# Patient Record
Sex: FEMALE | Race: WHITE
[De-identification: names, ages, dates, MRNs, and addresses within clinical notes are randomized per-mention and may not be internally consistent; named-entity substitution may affect disease eponyms.]

---

## 2017-07-26 ENCOUNTER — HOSPITAL ENCOUNTER (INPATIENT)
Dept: HOSPITAL 47 - EC | Age: 52
LOS: 7 days | Discharge: HOME | DRG: 199 | End: 2017-08-02
Attending: HOSPITALIST | Admitting: HOSPITALIST
Payer: MEDICAID

## 2017-07-26 VITALS — BODY MASS INDEX: 23.3 KG/M2

## 2017-07-26 DIAGNOSIS — J18.0: ICD-10-CM

## 2017-07-26 DIAGNOSIS — H40.9: ICD-10-CM

## 2017-07-26 DIAGNOSIS — S27.329A: ICD-10-CM

## 2017-07-26 DIAGNOSIS — S22.42XA: ICD-10-CM

## 2017-07-26 DIAGNOSIS — T40.605A: ICD-10-CM

## 2017-07-26 DIAGNOSIS — K59.03: ICD-10-CM

## 2017-07-26 DIAGNOSIS — S27.0XXA: Primary | ICD-10-CM

## 2017-07-26 DIAGNOSIS — J44.1: ICD-10-CM

## 2017-07-26 DIAGNOSIS — Z98.82: ICD-10-CM

## 2017-07-26 DIAGNOSIS — Z88.8: ICD-10-CM

## 2017-07-26 DIAGNOSIS — Z79.899: ICD-10-CM
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
## 2017-07-26 DIAGNOSIS — F17.210: ICD-10-CM

## 2017-07-26 DIAGNOSIS — J44.0: ICD-10-CM
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
## 2017-07-26 LAB
ALP SERPL-CCNC: 91 U/L (ref 38–126)
ALT SERPL-CCNC: 39 U/L (ref 9–52)
ANION GAP SERPL CALC-SCNC: 9 MMOL/L
AST SERPL-CCNC: 28 U/L (ref 14–36)
BASOPHILS # BLD AUTO: 0 K/UL (ref 0–0.2)
BASOPHILS NFR BLD AUTO: 0 %
BUN SERPL-SCNC: 12 MG/DL (ref 7–17)
CALCIUM SPEC-MCNC: 9.2 MG/DL (ref 8.4–10.2)
CH: 33.1
CHCM: 33.6
CHLORIDE SERPL-SCNC: 105 MMOL/L (ref 98–107)
CO2 SERPL-SCNC: 27 MMOL/L (ref 22–30)
EOSINOPHIL # BLD AUTO: 0 K/UL (ref 0–0.7)
EOSINOPHIL NFR BLD AUTO: 0 %
ERYTHROCYTE [DISTWIDTH] IN BLOOD BY AUTOMATED COUNT: 4.02 M/UL (ref 3.8–5.4)
ERYTHROCYTE [DISTWIDTH] IN BLOOD: 13.4 % (ref 11.5–15.5)
GLUCOSE SERPL-MCNC: 98 MG/DL (ref 74–99)
HCT VFR BLD AUTO: 39.9 % (ref 34–46)
HDW: 2.22
HGB BLD-MCNC: 13.5 GM/DL (ref 11.4–16)
LUC NFR BLD AUTO: 1 %
LYMPHOCYTES # SPEC AUTO: 1.1 K/UL (ref 1–4.8)
LYMPHOCYTES NFR SPEC AUTO: 12 %
MCH RBC QN AUTO: 33.5 PG (ref 25–35)
MCHC RBC AUTO-ENTMCNC: 33.8 G/DL (ref 31–37)
MCV RBC AUTO: 99.1 FL (ref 80–100)
MONOCYTES # BLD AUTO: 0.5 K/UL (ref 0–1)
MONOCYTES NFR BLD AUTO: 6 %
NEUTROPHILS # BLD AUTO: 6.9 K/UL (ref 1.3–7.7)
NEUTROPHILS NFR BLD AUTO: 80 %
NON-AFRICAN AMERICAN GFR(MDRD): >60
POTASSIUM SERPL-SCNC: 4.6 MMOL/L (ref 3.5–5.1)
PROT SERPL-MCNC: 7.2 G/DL (ref 6.3–8.2)
SODIUM SERPL-SCNC: 141 MMOL/L (ref 137–145)
WBC # BLD AUTO: 0.12 10*3/UL
WBC # BLD AUTO: 8.7 K/UL (ref 3.8–10.6)
WBC (PEROX): 8.17

## 2017-07-26 PROCEDURE — 80053 COMPREHEN METABOLIC PANEL: CPT

## 2017-07-26 PROCEDURE — 0W9B30Z DRAINAGE OF LEFT PLEURAL CAVITY WITH DRAINAGE DEVICE, PERCUTANEOUS APPROACH: ICD-10-PCS

## 2017-07-26 PROCEDURE — 32551 INSERTION OF CHEST TUBE: CPT

## 2017-07-26 PROCEDURE — 96375 TX/PRO/DX INJ NEW DRUG ADDON: CPT

## 2017-07-26 PROCEDURE — 96374 THER/PROPH/DIAG INJ IV PUSH: CPT

## 2017-07-26 PROCEDURE — 71010: CPT

## 2017-07-26 PROCEDURE — 94640 AIRWAY INHALATION TREATMENT: CPT

## 2017-07-26 PROCEDURE — 96376 TX/PRO/DX INJ SAME DRUG ADON: CPT

## 2017-07-26 PROCEDURE — 99285 EMERGENCY DEPT VISIT HI MDM: CPT

## 2017-07-26 PROCEDURE — 85025 COMPLETE CBC W/AUTO DIFF WBC: CPT

## 2017-07-26 PROCEDURE — 36415 COLL VENOUS BLD VENIPUNCTURE: CPT

## 2017-07-26 PROCEDURE — 71260 CT THORAX DX C+: CPT

## 2017-07-26 PROCEDURE — 80048 BASIC METABOLIC PNL TOTAL CA: CPT

## 2017-07-26 PROCEDURE — 94760 N-INVAS EAR/PLS OXIMETRY 1: CPT

## 2017-07-26 RX ADMIN — NICOTINE SCH PATCH: 21 PATCH, EXTENDED RELEASE TRANSDERMAL at 18:10

## 2017-07-26 RX ADMIN — IPRATROPIUM BROMIDE AND ALBUTEROL SULFATE SCH ML: .5; 3 SOLUTION RESPIRATORY (INHALATION) at 20:42

## 2017-07-26 RX ADMIN — HYDROMORPHONE HYDROCHLORIDE PRN MG: 1 INJECTION, SOLUTION INTRAMUSCULAR; INTRAVENOUS; SUBCUTANEOUS at 20:19

## 2017-07-26 RX ADMIN — LATANOPROST SCH DROPS: 50 SOLUTION OPHTHALMIC at 20:19

## 2017-07-26 RX ADMIN — AZITHROMYCIN SCH MG: 250 TABLET, FILM COATED ORAL at 18:14

## 2017-07-26 RX ADMIN — METHYLPREDNISOLONE SODIUM SUCCINATE SCH MG: 40 INJECTION, POWDER, FOR SOLUTION INTRAMUSCULAR; INTRAVENOUS at 22:55

## 2017-07-26 RX ADMIN — HEPARIN SODIUM SCH UNIT: 5000 INJECTION, SOLUTION INTRAVENOUS; SUBCUTANEOUS at 20:18

## 2017-07-26 RX ADMIN — HYDROCODONE BITARTRATE AND ACETAMINOPHEN PRN EACH: 5; 325 TABLET ORAL at 22:56

## 2017-07-26 RX ADMIN — METHYLPREDNISOLONE SODIUM SUCCINATE SCH MG: 40 INJECTION, POWDER, FOR SOLUTION INTRAMUSCULAR; INTRAVENOUS at 18:14

## 2017-07-26 RX ADMIN — CEFAZOLIN SCH MLS/HR: 330 INJECTION, POWDER, FOR SOLUTION INTRAMUSCULAR; INTRAVENOUS at 18:10

## 2017-07-26 NOTE — ED
General Adult HPI





- General


Source: patient, RN notes reviewed


Mode of arrival: ambulatory


Limitations: no limitations





<Kimberley Stauffer - Last Filed: 07/26/17 16:21>





<Endy Hawkins - Last Filed: 07/26/17 16:33>





- General


Chief complaint: Fall


Stated complaint: Fall/Left Flank Pain


Time Seen by Provider: 07/26/17 14:43





- History of Present Illness


Initial comments: 





52-year-old female presents to the emergency Department chief complaint of left-

sided chest pain.  Patient states that this pain started yesterday after she 

fell hitting the left side of her ribs.  Patient states there is a take a deep 

breath it hurts to cough.  Patient states that she hasn't had any fever chills 

there is no head injury.  Patient denies any other complaints at this time.  

Patient was concerned due to her symptoms so she thought that she should be 

evaluated.  Patient denies any recent fever, chills, shortness of breath, chest 

pain, back pain, abdominal pain, nausea vomiting, numbness or tingling, dysuria 

or hematuria, constipation or diarrhea, headaches or visual changes, or any 

other current symptoms. (Kimberley Stauffer)





- Related Data


 Home Medications











 Medication  Instructions  Recorded  Confirmed


 


Latanoprost Ophth [Xalatan 0.005%] 1 drops BOTH EYES HS 07/26/17 07/26/17


 


Timolol 0.5% Ophth Soln [Timoptic 1 drop BOTH EYES DAILY 07/26/17 07/26/17





0.5% Ophth Soln]   











 Allergies











Allergy/AdvReac Type Severity Reaction Status Date / Time


 


metronidazole [From Flagyl] Allergy  Rash/Hives Verified 07/26/17 14:08














Review of Systems


ROS Other: All systems not noted in ROS Statement are negative.





<Kimberley Stauffer - Last Filed: 07/26/17 16:21>


ROS Other: All systems not noted in ROS Statement are negative.





<Endy Hawkins - Last Filed: 07/26/17 16:33>


ROS Statement: 


Those systems with pertinent positive or pertinent negative responses have been 

documented in the HPI.








Past Medical History


Past Medical History: No Reported History


History of Any Multi-Drug Resistant Organisms: None Reported


Past Surgical History: No Surgical Hx Reported


Past Psychological History: No Psychological Hx Reported


Smoking Status: Current every day smoker


Past Alcohol Use History: Occasional


Past Drug Use History: None Reported





<Kimberley Stauffer - Last Filed: 07/26/17 16:21>





General Exam


Limitations: no limitations





<Kimberley Stauffer - Last Filed: 07/26/17 16:21>


General appearance: alert, anxious, in distress


Head exam: Present: atraumatic, normocephalic, normal inspection


Eye exam: Present: normal appearance, PERRL, EOMI.  Absent: scleral icterus, 

conjunctival injection, periorbital swelling


ENT exam: Present: normal exam, mucous membranes moist


Neck exam: Present: normal inspection.  Absent: tenderness, meningismus, 

lymphadenopathy


Respiratory exam: Present: normal lung sounds bilaterally.  Absent: respiratory 

distress, wheezes, rales, rhonchi, stridor


Cardiovascular Exam: Present: normal rhythm, tachycardia, normal heart sounds.  

Absent: systolic murmur, diastolic murmur, rubs, gallop, clicks


GI/Abdominal exam: Present: soft, normal bowel sounds.  Absent: distended, 

tenderness, guarding, rebound, rigid


Extremities exam: Present: normal inspection, full ROM, normal capillary 

refill.  Absent: tenderness, pedal edema, joint swelling, calf tenderness


Back exam: Present: normal inspection


Neurological exam: Present: alert, oriented X3, CN II-XII intact


Psychiatric exam: Present: normal affect, normal mood


Skin exam: Present: warm, dry, intact, normal color.  Absent: rash





<Endy Hawkins - Last Filed: 07/26/17 16:33>





- General Exam Comments


Initial Comments: 





General:  The patient is awake and alert, in no distress, and does not appear 

acutely ill. 


Eye:  Pupils are equal, round and reactive to light, extra-ocular movements are 

intact; there is normal conjunctiva bilaterally.  No signs of icterus.  


Ears, nose, mouth and throat:  There are moist mucous membranes and no oral 

lesions. 


Neck:  The neck is supple, there is no tenderness


Cardiovascular:  There is a regular rate and rhythm. No murmur, rub or gallop 

is appreciated.  Tenderness over the left lateral chest wall


Respiratory:  Lungs are clear to auscultation, respirations are non-labored, 

breath sounds are equal.  No wheezes, stridor, rales, or rhonchi.


Gastrointestinal:  Soft, non-distended, non-tender abdomen without masses or 

organomegaly noted. There is no rebound or guarding present.  No CVA 

tenderness. Bowel sounds are unremarkable.


Back:  There is no tenderness to palpation in the midline. There is no obvious 

deformity. No rashes noted. 


Musculoskeletal:  Normal ROM, no tenderness, There is no pedal edema. There is 

no calf tenderness or swelling. Sensation intact. Pulses equal bilaterally 2+.  


Neurological:  CN II-XII intact, There are no obvious motor or sensory 

deficits. Coordination appears grossly intact. Speech is normal.


Skin:  Skin is warm and dry and no rashes or lesions are noted. 


Psychiatric:  Cooperative, appropriate mood & affect, normal judgment.  


 (Kimberley Stauffer)





Course





<Kimberley Stauffer - Last Filed: 07/26/17 16:21>





<Endy Hawkins - Last Filed: 07/26/17 16:33>





 Vital Signs











  07/26/17 07/26/17





  14:06 16:12


 


Temperature 98.6 F 


 


Pulse Rate 123 H 107 H


 


Respiratory 18 18





Rate  


 


Blood Pressure 127/74 


 


O2 Sat by Pulse 96 





Oximetry  














- Reevaluation(s)


Reevaluation #1: 





07/26/17 16:33


good breathing with thoravent placement (Endy Hawkins)





Procedures





- Chest Tube Insertion


Consent Obtained: verbal consent


Time Out Performed: Yes


Side of Procedure: left


Indication: Pneumothorax


Placed on monitor/pulse oximetry: Yes


Site Prep: Chloroprep


Local Anesthesia: Lidocaine 1%, With Epi


Insertion Site: Midaxillary, Other (2nd)


Tube Size (English): Other (10)


Returns: Air


Sutured in Place: No (bandage)


Dressing Applied: Tape


Attached to Suction: Yes


Type of Suction: Pleuravac


Repeat X-ray Results: Lung Inflated


Patient Tolerated Procedure: well


Complications: Pain





<Endy Hawkins - Last Filed: 07/26/17 16:33>





Medical Decision Making





- Radiology Data


Radiology results: report reviewed, image reviewed





<Kimberley Stauffer - Last Filed: 07/26/17 16:21>





- Lab Data


Result diagrams: 


 07/26/17 16:06








<Endy Hawkins - Last Filed: 07/26/17 16:33>





- Medical Decision Making





52-year-old female presents for left-sided rib pain after fall.  Imaging was 

reviewed that does show a pneumothorax.  This time Dr. Hawkins placed a 

thoravac.  At this time there was successful placement.  This and the patient 

will be admitted to Dr. Ree Bolden will be consult at.  Patient this 

plan. (Kimberley Stauffer)





- Lab Data





 Lab Results











  07/26/17 Range/Units





  16:06 


 


WBC  8.7  (3.8-10.6)  k/uL


 


RBC  4.02  (3.80-5.40)  m/uL


 


Hgb  13.5  (11.4-16.0)  gm/dL


 


Hct  39.9  (34.0-46.0)  %


 


MCV  99.1  (80.0-100.0)  fL


 


MCH  33.5  (25.0-35.0)  pg


 


MCHC  33.8  (31.0-37.0)  g/dL


 


RDW  13.4  (11.5-15.5)  %


 


Plt Count  215  (150-450)  k/uL


 


Neutrophils %  80  %


 


Lymphocytes %  12  %


 


Monocytes %  6  %


 


Eosinophils %  0  %


 


Basophils %  0  %


 


Neutrophils #  6.9  (1.3-7.7)  k/uL


 


Lymphocytes #  1.1  (1.0-4.8)  k/uL


 


Monocytes #  0.5  (0-1.0)  k/uL


 


Eosinophils #  0.0  (0-0.7)  k/uL


 


Basophils #  0.0  (0-0.2)  k/uL














Critical Care Time


Critical Care Time: Yes


Total Critical Care Time: 31





<Endy Hawkins - Last Filed: 07/26/17 16:33>





Disposition


Time of Disposition: 16:22


Decision Date: 07/26/17


Decision Time: 16:22





<Kimberley Stauffer - Last Filed: 07/26/17 16:21>





<Endy Hawkins - Last Filed: 07/26/17 16:33>


Clinical Impression: 


 Fall, Contusion of rib on left side, Acute pneumothorax





Disposition: ADMITTED AS IP TO THIS [facility]


Condition: Stable


Referrals: 


None,Stated [Primary Care Provider] - 1-2 days

## 2017-07-26 NOTE — P.CNPUL
History of Present Illness


Consult date: 07/26/17


Reason for consult: COPD, pneumothorax


History of present illness: 





2-year-old female patient, chronic smoker, known history of COPD, recurrent 

bronchitis, was having symptoms of bronchitis and some mild symptoms of COPD 

exacerbation over the past 2 days.  She subsequently had a fall and her garage 

and she landed on her left chest and she developed significant left-sided chest 

pain increased shortness of breath.  Overnight she decided not to come into the 

hospital.  This morning her breathing was getting worse and she was having also 

increased congestions cough and wheezing.  For that reason, the patient came 

into the emergency department and she was found to have a 15-20% pneumothorax 

in the left apex.  No evidence of any rib fractures on the chest x-ray.  She 

was quite bronchospastic and wheezy and short of breath and tachycardic.  No 

signs of any tension pneumothorax and she was hemodynamically stable.  Pulse ox 

was around 95% on room air.  No hemoptysis.  She was however a having excess 

amount of pain along her left chest at the site of the trauma.  No chest wall 

deformity.  I attended on this patient in the emergency department.  Reviewed 

the chest x-ray.  Discussed with Dr. Hawkins and we inserted a Thoravent with 

successful reexpansion of the left lung.





Review of Systems


All systems: negative


Constitutional: Denies chills, Denies fever


Eyes: denies blurred vision, denies pain


Ears, nose, mouth and throat: Denies headache, Denies sore throat


Cardiovascular: Reports chest pain, Reports decreased exercise tolerance, 

Reports shortness of breath


Respiratory: Reports cough, Reports dyspnea, Reports respiratory infections, 

Reports wheezing


Gastrointestinal: Denies abdominal pain, Denies diarrhea, Denies nausea, Denies 

vomiting


Genitourinary: Denies dysuria, Denies hematuria


Musculoskeletal: Denies myalgias


Integumentary: Denies pruritus, Denies rash


Neurological: Denies numbness, Denies weakness


Psychiatric: Denies anxiety, Denies depression


Endocrine: Denies fatigue, Denies weight change





Past Medical History


Past Medical History: No Reported History, COPD


Additional Past Medical History / Comment(s): COPD, glaucoma


History of Any Multi-Drug Resistant Organisms: None Reported


Past Surgical History: No Surgical Hx Reported, Breast Surgery


Additional Past Surgical History / Comment(s): Cosmetic bilateral breast 

implants


Past Psychological History: No Psychological Hx Reported


Smoking Status: Current every day smoker (Patient smokes one half pack of 

cigarettes a day and she is a 16-easy-jlxa smoker.)


Past Alcohol Use History: Occasional


Past Drug Use History: None Reported





Medications and Allergies


 Home Medications











 Medication  Instructions  Recorded  Confirmed  Type


 


Latanoprost Ophth [Xalatan 0.005%] 1 drops BOTH EYES HS 07/26/17 07/26/17 

History


 


Timolol 0.5% Ophth Soln [Timoptic 1 drop BOTH EYES DAILY 07/26/17 07/26/17 

History





0.5% Ophth Soln]    











 Allergies











Allergy/AdvReac Type Severity Reaction Status Date / Time


 


metronidazole [From Flagyl] Allergy  Rash/Hives Verified 07/26/17 14:08














Physical Exam


Vitals: 


 Vital Signs











  Temp Pulse Resp BP Pulse Ox


 


 07/26/17 14:06  98.6 F  123 H  18  127/74  96








 Intake and Output











 07/26/17 07/26/17 07/26/17





 06:59 14:59 22:59


 


Other:   


 


  Weight  63.503 kg 








 Patient Weight











 07/27/17





 06:59


 


Weight 63.503 kg














Head exam was generally normal. There was no scleral icterus or corneal arcus. 

Mucous membranes were moist.Neck was supple and without jugular venous 

distension, thyromegaly, or carotid bruits. Carotids were easily palpable 

bilaterally. There was no adenopathy.  Lung sounds are diminished in the lung 

apex on the left compared to the right.  There is scattered rhonchi and 

expiratory wheezes throughout the lung fields bilaterally.Cardiac exam revealed 

the PMI to be normally situated and sized. The rhythm was regular and no 

extrasystoles were noted during several minutes of auscultation. The first and 

second heart sounds were normal and physiologic splitting of the second heart 

sound was noted. There were no murmurs, rubs, clicks, or gallops.Abdominal exam 

revealed normal bowel sounds. The abdomen was soft, non-tender, and without 

masses, organomegaly, or appreciable enlargement of the abdominal 

aorta.Examination of the extremities revealed easily palpable radial, femoral 

and pedal pulses. There was no cyanosis, clubbing or edema.





Results





- Diagnostic Findings


Chest x-ray: image reviewed





Assessment and Plan


Plan: 





Assessment





1 traumatic left-sided pneumothorax 15-20% involving the left apex, status post 

successful insertion of a thoravent





2 left-sided chest wall pain, traumatic, rule out underlying pulmonary contusion





3 COPD exacerbation/symptoms of acute bronchitis





4 shortness of breath secondary to above





5 smoker





6 glaucoma





Plan





Start the patient on DuoNeb nebulized treatments around the clock.  We'll put 

the patient on 40 mg of IV Solu Medrol every 6 hours.  We'll put the patient a 

course of Zithromax 500 mg by mouth on a daily basis.  Daily chest x-ray.  

Incentive spirometer.  Dilaudid 0.5-1 mg every 4-6 hours for pain control.  

Subcu heparin for DVT prophylaxis.  Nicotine patch.  Smoking cessation 

counseling was done.  We'll continue to follow.  Blood work will be obtained.

## 2017-07-26 NOTE — XR
EXAMINATION TYPE: XR chest 1V portable

 

DATE OF EXAM: 7/26/2017

 

HISTORY: Pain.

 

REFERENCE: Previous study of earlier today.

 

FINDINGS: Again without is been placed in the right chest. There is near complete resolution of the p
atient's left-sided pneumothorax. A tiny residual pneumothorax persists which is less than 10% by vol
ume.

 

There is a calcified granuloma in the right upper lobe. The lungs are otherwise clear. Pleural spaces
 are clear. Heart size is upper limits of normal.

 

IMPRESSION: 

 

STATUS POST HEIMLICH VALVE REPLACEMENT WITH NEAR COMPLETE RESOLUTION OF THE PATIENT'S LEFT-SIDED PNEU
MOTHORAX.

## 2017-07-26 NOTE — P.GSCN
History of Present Illness


Consult date: 07/26/17


Reason for Consult: 





Left-sided pneumothorax status post fall


History of present illness: 





This a 52-year-old female who is admitted to the hospital.  Patient slipped in 

her garage yesterday.  She states this floor was wet she slipped and struck a 

shelving unit in her garage.  Dissected pain after the fall.  She didn't feel 

well this morning and presented to the emergency room found have a 25% left 

pneumothorax.  There are no evidence of rib fractures.  Patient currently has a 

pneumo vent in place.  Her pneumothorax has resolved.





Review of Systems





- Constitutional


Reports as per HPI





Past Medical History


Past Medical History: No Reported History


Additional Past Medical History / Comment(s): COPD, glaucoma


History of Any Multi-Drug Resistant Organisms: None Reported


Past Surgical History: No Surgical Hx Reported


Additional Past Surgical History / Comment(s): Cosmetic bilateral breast 

implants


Past Psychological History: No Psychological Hx Reported


Smoking Status: Current every day smoker


Past Alcohol Use History: Occasional


Past Drug Use History: None Reported





Medications and Allergies


 Home Medications











 Medication  Instructions  Recorded  Confirmed  Type


 


Latanoprost Ophth [Xalatan 0.005%] 1 drops BOTH EYES HS 07/26/17 07/26/17 

History


 


Timolol 0.5% Ophth Soln [Timoptic 1 drop BOTH EYES DAILY 07/26/17 07/26/17 

History





0.5% Ophth Soln]    











 Allergies











Allergy/AdvReac Type Severity Reaction Status Date / Time


 


metronidazole [From Flagyl] Allergy  Rash/Hives Verified 07/26/17 14:08














Surgical - Exam


 Vital Signs











Temp Pulse Resp BP Pulse Ox


 


 98.6 F   123 H  18   127/74   96 


 


 07/26/17 14:06  07/26/17 14:06  07/26/17 14:06  07/26/17 14:06  07/26/17 14:06














- General


well developed, no distress





- Eyes


PERRL





- ENT


normal pinna





- Neck


no masses





- Respiratory


normal expansion





- Cardiovascular


Rhythm: regular





- Abdomen


Abdomen: soft, non tender





Results





- Labs





 07/26/17 16:06





 07/26/17 16:06


 Diabetes panel











  07/26/17 Range/Units





  16:06 


 


Sodium  141  (137-145)  mmol/L


 


Potassium  4.6  (3.5-5.1)  mmol/L


 


Chloride  105  ()  mmol/L


 


Carbon Dioxide  27  (22-30)  mmol/L


 


BUN  12  (7-17)  mg/dL


 


Creatinine  0.75  (0.52-1.04)  mg/dL


 


Glucose  98  (74-99)  mg/dL


 


Calcium  9.2  (8.4-10.2)  mg/dL


 


AST  28  (14-36)  U/L


 


ALT  39  (9-52)  U/L


 


Alkaline Phosphatase  91  ()  U/L


 


Total Protein  7.2  (6.3-8.2)  g/dL


 


Albumin  4.5  (3.5-5.0)  g/dL








 Calcium panel











  07/26/17 Range/Units





  16:06 


 


Calcium  9.2  (8.4-10.2)  mg/dL


 


Albumin  4.5  (3.5-5.0)  g/dL








 Pituitary panel











  07/26/17 Range/Units





  16:06 


 


Sodium  141  (137-145)  mmol/L


 


Potassium  4.6  (3.5-5.1)  mmol/L


 


Chloride  105  ()  mmol/L


 


Carbon Dioxide  27  (22-30)  mmol/L


 


BUN  12  (7-17)  mg/dL


 


Creatinine  0.75  (0.52-1.04)  mg/dL


 


Glucose  98  (74-99)  mg/dL


 


Calcium  9.2  (8.4-10.2)  mg/dL








 Adrenal panel











  07/26/17 Range/Units





  16:06 


 


Sodium  141  (137-145)  mmol/L


 


Potassium  4.6  (3.5-5.1)  mmol/L


 


Chloride  105  ()  mmol/L


 


Carbon Dioxide  27  (22-30)  mmol/L


 


BUN  12  (7-17)  mg/dL


 


Creatinine  0.75  (0.52-1.04)  mg/dL


 


Glucose  98  (74-99)  mg/dL


 


Calcium  9.2  (8.4-10.2)  mg/dL


 


Total Bilirubin  0.5  (0.2-1.3)  mg/dL


 


AST  28  (14-36)  U/L


 


ALT  39  (9-52)  U/L


 


Alkaline Phosphatase  91  ()  U/L


 


Total Protein  7.2  (6.3-8.2)  g/dL


 


Albumin  4.5  (3.5-5.0)  g/dL














Assessment and Plan


Plan: 





Status post left pneumothorax after fall.  Patient is doing well.  She'll have 

repeat x-rays perform the morning.

## 2017-07-26 NOTE — XR
EXAMINATION TYPE: XR ribs LT w pa chest x-ray , 5 VIEWS

 

DATE OF EXAM ORDERED: 7/26/2017

 

HISTORY: Pain.

 

COMPARISON: None.

 

FINDINGS:  There is blunting of the left CP angle. I could not exclude a left effusion.

 

There is a 20-25% by volume left sided apical pneumothorax. The lungs are clear. Heart size is upper 
limits of normal. No displaced rib fracture is seen.

 

IMPRESSION: 

1. 25% BY VOLUME, LEFT SIDED APICAL PNEUMOTHORAX.

2. SMALL LEFT-SIDED EFFUSION.

3. I DO NOT IDENTIFY A DISPLACED RIB FRACTURE.

## 2017-07-27 RX ADMIN — HYDROMORPHONE HYDROCHLORIDE PRN MG: 1 INJECTION, SOLUTION INTRAMUSCULAR; INTRAVENOUS; SUBCUTANEOUS at 23:32

## 2017-07-27 RX ADMIN — METHYLPREDNISOLONE SODIUM SUCCINATE SCH MG: 40 INJECTION, POWDER, FOR SOLUTION INTRAMUSCULAR; INTRAVENOUS at 23:32

## 2017-07-27 RX ADMIN — HYDROCODONE BITARTRATE AND ACETAMINOPHEN PRN EACH: 5; 325 TABLET ORAL at 05:14

## 2017-07-27 RX ADMIN — CEFAZOLIN SCH MLS/HR: 330 INJECTION, POWDER, FOR SOLUTION INTRAMUSCULAR; INTRAVENOUS at 05:14

## 2017-07-27 RX ADMIN — METHYLPREDNISOLONE SODIUM SUCCINATE SCH MG: 40 INJECTION, POWDER, FOR SOLUTION INTRAMUSCULAR; INTRAVENOUS at 11:12

## 2017-07-27 RX ADMIN — IPRATROPIUM BROMIDE AND ALBUTEROL SULFATE SCH ML: .5; 3 SOLUTION RESPIRATORY (INHALATION) at 11:06

## 2017-07-27 RX ADMIN — HYDROMORPHONE HYDROCHLORIDE PRN MG: 1 INJECTION, SOLUTION INTRAMUSCULAR; INTRAVENOUS; SUBCUTANEOUS at 06:43

## 2017-07-27 RX ADMIN — IPRATROPIUM BROMIDE AND ALBUTEROL SULFATE SCH ML: .5; 3 SOLUTION RESPIRATORY (INHALATION) at 15:20

## 2017-07-27 RX ADMIN — HYDROMORPHONE HYDROCHLORIDE PRN MG: 1 INJECTION, SOLUTION INTRAMUSCULAR; INTRAVENOUS; SUBCUTANEOUS at 20:19

## 2017-07-27 RX ADMIN — HEPARIN SODIUM SCH UNIT: 5000 INJECTION, SOLUTION INTRAVENOUS; SUBCUTANEOUS at 21:35

## 2017-07-27 RX ADMIN — IPRATROPIUM BROMIDE AND ALBUTEROL SULFATE SCH ML: .5; 3 SOLUTION RESPIRATORY (INHALATION) at 19:43

## 2017-07-27 RX ADMIN — METHYLPREDNISOLONE SODIUM SUCCINATE SCH MG: 40 INJECTION, POWDER, FOR SOLUTION INTRAMUSCULAR; INTRAVENOUS at 05:14

## 2017-07-27 RX ADMIN — NICOTINE SCH PATCH: 21 PATCH, EXTENDED RELEASE TRANSDERMAL at 15:51

## 2017-07-27 RX ADMIN — IPRATROPIUM BROMIDE AND ALBUTEROL SULFATE SCH ML: .5; 3 SOLUTION RESPIRATORY (INHALATION) at 07:03

## 2017-07-27 RX ADMIN — HEPARIN SODIUM SCH UNIT: 5000 INJECTION, SOLUTION INTRAVENOUS; SUBCUTANEOUS at 07:33

## 2017-07-27 RX ADMIN — METHYLPREDNISOLONE SODIUM SUCCINATE SCH MG: 40 INJECTION, POWDER, FOR SOLUTION INTRAMUSCULAR; INTRAVENOUS at 15:51

## 2017-07-27 RX ADMIN — HYDROMORPHONE HYDROCHLORIDE PRN MG: 1 INJECTION, SOLUTION INTRAMUSCULAR; INTRAVENOUS; SUBCUTANEOUS at 00:36

## 2017-07-27 RX ADMIN — AZITHROMYCIN SCH MG: 250 TABLET, FILM COATED ORAL at 15:51

## 2017-07-27 RX ADMIN — LATANOPROST SCH DROPS: 50 SOLUTION OPHTHALMIC at 21:35

## 2017-07-27 RX ADMIN — CEFAZOLIN SCH: 330 INJECTION, POWDER, FOR SOLUTION INTRAMUSCULAR; INTRAVENOUS at 16:04

## 2017-07-27 RX ADMIN — HYDROMORPHONE HYDROCHLORIDE PRN MG: 1 INJECTION, SOLUTION INTRAMUSCULAR; INTRAVENOUS; SUBCUTANEOUS at 15:51

## 2017-07-27 RX ADMIN — HYDROMORPHONE HYDROCHLORIDE PRN MG: 1 INJECTION, SOLUTION INTRAMUSCULAR; INTRAVENOUS; SUBCUTANEOUS at 03:41

## 2017-07-27 RX ADMIN — TIMOLOL MALEATE SCH DROPS: 5 SOLUTION OPHTHALMIC at 07:33

## 2017-07-27 RX ADMIN — PANTOPRAZOLE SODIUM SCH MG: 40 TABLET, DELAYED RELEASE ORAL at 06:42

## 2017-07-27 RX ADMIN — HYDROCODONE BITARTRATE AND ACETAMINOPHEN PRN EACH: 5; 325 TABLET ORAL at 21:35

## 2017-07-27 RX ADMIN — HYDROMORPHONE HYDROCHLORIDE PRN MG: 1 INJECTION, SOLUTION INTRAMUSCULAR; INTRAVENOUS; SUBCUTANEOUS at 12:57

## 2017-07-27 RX ADMIN — HYDROMORPHONE HYDROCHLORIDE PRN MG: 1 INJECTION, SOLUTION INTRAMUSCULAR; INTRAVENOUS; SUBCUTANEOUS at 09:41

## 2017-07-27 NOTE — P.PN
Subjective





52-year-old female patient, chronic smoker, known history of COPD, recurrent 

bronchitis, was having symptoms of bronchitis and some mild symptoms of COPD 

exacerbation over the past 2 days.  She subsequently had a fall and her garage 

and she landed on her left chest and she developed significant left-sided chest 

pain increased shortness of breath.  Overnight she decided not to come into the 

hospital.  This morning her breathing was getting worse and she was having also 

increased congestions cough and wheezing.  For that reason, the patient came 

into the emergency department and she was found to have a 15-20% pneumothorax 

in the left apex.  No evidence of any rib fractures on the chest x-ray.  She 

was quite bronchospastic and wheezy and short of breath and tachycardic.  No 

signs of any tension pneumothorax and she was hemodynamically stable.  Pulse ox 

was around 95% on room air.  No hemoptysis.  She was however a having excess 

amount of pain along her left chest at the site of the trauma.  No chest wall 

deformity.  I attended on this patient in the emergency department.  Reviewed 

the chest x-ray.  Discussed with Dr. Hawkins and we inserted a Thoravent with 

successful reexpansion of the left lung.





The patient is seen again today 07/27/2017 in follow-up on the selective care 

unit.  She is awake and alert in no acute distress.  She is breathing easier 

today as compared to yesterday.  Her pain is well controlled.  She is 

maintaining good O2 saturations in the mid 90s on room air.  She's been 

afebrile.  Hemodynamically stable.  Her chest x-ray reveals a small apical 

pneumothorax on the left.  There is no clear evidence of rib fractures however 

on auscultation posteriorly on the left chest there is cracking and friction 

noted.  She does feel that in this area with a deep inhalation as well.  She 

remains on bronchodilators, IV Solu-Medrol and empiric antibiotics in the form 

of azithromycin.





Objective





- Vital Signs


Vital signs: 


 Vital Signs











Temp  97.4 F L  07/27/17 11:18


 


Pulse  84   07/27/17 11:25


 


Resp  16   07/27/17 11:21


 


BP  115/69   07/27/17 11:18


 


Pulse Ox  94 L  07/27/17 11:18








 Intake & Output











 07/26/17 07/27/17 07/27/17





 18:59 06:59 18:59


 


Intake Total  930 125


 


Balance  930 125


 


Weight 63.503 kg  


 


Intake:   


 


  IV  450 


 


    Sodium Chloride 0.9% 1,  450 





    000 ml @ 80 mls/hr IV .   





    [id number] [facility] Rx#:[id number]   


 


  Oral  480 125


 


Other:   


 


  Voiding Method  Toilet 


 


  # Voids  1 














- Exam





Head exam was generally normal. There was no scleral icterus or corneal arcus. 

Mucous membranes were moist.Neck was supple and without jugular venous 

distension, thyromegaly, or carotid bruits. Carotids were easily palpable 

bilaterally. There was no adenopathy.  Lung sounds are diminished in the lung 

apex on the left compared to the right.  There is scattered rhonchi and 

expiratory wheezes throughout the lung fields bilaterally.Cardiac exam revealed 

the PMI to be normally situated and sized. The rhythm was regular and no 

extrasystoles were noted during several minutes of auscultation. The first and 

second heart sounds were normal and physiologic splitting of the second heart 

sound was noted. There were no murmurs, rubs, clicks, or gallops.Abdominal exam 

revealed normal bowel sounds. The abdomen was soft, non-tender, and without 

masses, organomegaly, or appreciable enlargement of the abdominal 

aorta.Examination of the extremities revealed easily palpable radial, femoral 

and pedal pulses. There was no cyanosis, clubbing or edema.





- Labs


CBC & Chem 7: 


 07/26/17 16:06





 07/26/17 16:06





Assessment and Plan


Plan: 





Assessment:





1 traumatic left-sided pneumothorax 15-20% involving the left apex, status post 

successful insertion of a thoravent, small minimal apical thorax remains.





2 left-sided chest wall pain, traumatic, rule out underlying pulmonary contusion

, suspect left posterior fractured rib.





3 COPD exacerbation/symptoms of acute bronchitis





4 shortness of breath secondary to above





5 smoker





6 glaucoma





Plan:





The patient was seen and evaluated by Dr. Bolden.  Her chest x-ray was 

reviewed.  There is still a small apical pneumothorax noted on the left.  Dura-

Vent remains in place.  We'll continue with her pain medications.  We'll keep 

her another 24 hours.  We'll continue treating her COPD exacerbation with 

bronchodilators, IV Solu-Medrol and antibiotics.  We will increase her activity 

as tolerated.  We'll offer a NicoDerm patch.  She'll be educated regarding the 

incentive spirometer and cough and deep breathing exercises as tolerated.  We'

ll continue to follow.

## 2017-07-27 NOTE — XR
EXAMINATION TYPE: XR chest 1V

 

DATE OF EXAM: 7/27/2017

 

COMPARISON: Prior chest x-ray 7/26/2017

 

HISTORY: Pneumothorax

 

TECHNIQUE: Single frontal view of the chest is obtained.

 

FINDINGS:  Left-sided thoracic pain is in place, small apical pneumothorax is present. Distal tip of 
the thoracic vent tubing courses caudally. No evident effusion. Retrocardiac density likely represent
s basilar atelectasis. Correlate to exclude pneumonia. Heart size may be accentuated by rotation. The
re are overlying cardiac leads.

 

IMPRESSION:  Small apical pneumothorax persists. Additional findings above.

## 2017-07-27 NOTE — HP
DATE OF ADMISSION:    07/26/17



CHIEF COMPLAINT:    Left sided chest pain and shortness of breath. 



HISTORY OF PRESENT ILLNESS: This 58-year-old woman with a past medical history 
of multiple medical problems including  COPD, glaucoma being followed by Dr. Bolden in the outpatient setting apparently fell in the garage yesterday on 
the left side and the patient was complaining of pain and feeling of pain 
especially on deep breaths.  The patient also had shortness of breath.  The 
patient came to [facility] and was admitted to the hospital for 
further evaluation and treatment.  

Chest x-ray was done which showed  rib x-ray was done and chest x-ray was done.
  Rib x-ray showed 24% pneumothorax, left sided  small left sided pleural 
effusion and displaced fracture was not seen. The patient admitted to the 
hospital for further evaluation and treatment.  There is no history of any 
fevers, rigors or chills. No history of any headache, loss of consciousness or 
seizures.  valve was placed by Dr. Bolden. 

Past medical history of COPD.  History of glaucoma. 



Medications prior to admission are:   Home medications are reviewed and include:

1.   Timolol 5% ophthalmic solution.

2.   Xalatan eye drops.   



ALLERGIES: FLAGYL.

FAMILY HISTORY:   No history of heart disease or strokes in the family.

SOCIAL HISTORY:     History of smoking. No history of alcohol intake. 



REVIEW OF SYSTEMS:   HEENT: No diminished vision. No diminished hearing.  
Cardiovascular system: No angina or palpitations.  Respiratory:  As mentioned 
earlier. GI: Nausea or vomiting.  : No dysuria. Nervous system:  No numbness, 
weakness.  Allergy/Immunology: No asthma or hayfever.  Musculoskeletal: As 
mentioned earlier.  Hematology/oncology: No history of anemia.  Endocrine.   No 
history of diabetes or hypothyroidism.  Constitutional:  As mentioned earlier.  
Dermatology: Negative.  Rheumatology: Negative. Psychiatry:  As mentioned 
earlier.



PHYSICAL EXAMINATION: The patient is alert and oriented times three.  Pulse 81.
  Blood pressure 108/87.  Respiratory rate 16, temperature 98.2 degrees.   
Pulse ox 92% on 2 L.   HEENT: Conjunctivae normal.   NECK:  No JVD.  
Cardiovascular:  S1, S2 muffled.  Respiratory: Breath sounds diminished at the 
bases.    Scattered rhonchi and crackles.  Abdomen is soft.    Nontender.  No 
mass palpable.  Legs:   No edema.    No swelling.  Nervous system:  No focal 
deficits.  SKIN: No ulcer, rash or bleeding.   

LABS:   CBC within normal limits. 



ASSESSMENT: 

1.   Fall and left sided pneumothorax status post  valve.

2.   Left sided chest pain.

3.   Chronic obstructive pulmonary disease.

4.   Glaucoma. 



RECOMMENDATIONS AND DISCUSSION: In this 52 -year-old woman who presented with 
multiple complex medical issues, we will monitor the patient closely.  Continue 
the current medications.  Continue symptomatic treatment.   Otherwise, at this 
time, I would recommend pain medications. Incentive spirometry.   Closely 
follow with Dr. Bolden. See orders for further details. Further 
recommendations to follow.    
MTDD

## 2017-07-27 NOTE — PN
DATE OF SERVICE:  07/27/17



This  52-year-old woman was admitted with left sided pneumothorax is being 
closely monitored.  No chest pain. No palpitations. No fever. 

PHYSICAL EXAMINATION: The patient is alert and oriented times three.  Pulse 73. 
Blood pressure 115/69. Respiratory rate 16. Temperature 97.4.  Pulse ox 94% on 
room air.  

HEENT: Conjunctivae normal.  NECK: No JVD.  Cardiovascular: S1, S2 muffled.  
Respiratory: Breath sounds diminished at the bases.  A few scattered rhonchi 
and crackles.  Abdomen soft. Nontender. Legs, no edema. No swelling.  Nervous 
system:   No focal deficits. 



Labs: CBC, BMP within normal limits.



ASSESSMENT:

1.   Fall and left sided pneumothorax status post Heimlich valve.

2.   Left sided chest pain. 

3.   Chronic obstructive pulmonary disease.

4.   Glaucoma.  



RECOMMENDATIONS AND DISCUSSION:  Recommend to continue the current medications.
  Continue with monitoring and symptomatic treatment.  Otherwise, closely 
follow with Dr. Bolden.  Pain medications.  Follow-up x-rays.    Further 
recommendations to follow.  
PARRIS

## 2017-07-27 NOTE — P.PN
Progress Note - Text





Resting comfortably in her bed.  She has no complaints.  She has some minimal 

chest pain on the left side.  Her repeat chest x-ray shows a small apical 

pneumothorax.





On exam her vitals are stable.  Abdomen soft.





Small residual apical pneumothorax.  Patient will continue to have her thoracic 

vent in place.  She'll be discharged home per pulmonary.

## 2017-07-28 LAB
ALP SERPL-CCNC: 66 U/L (ref 38–126)
ALT SERPL-CCNC: 33 U/L (ref 9–52)
ANION GAP SERPL CALC-SCNC: 9 MMOL/L
AST SERPL-CCNC: 20 U/L (ref 14–36)
BASOPHILS # BLD AUTO: 0 K/UL (ref 0–0.2)
BASOPHILS NFR BLD AUTO: 0 %
BUN SERPL-SCNC: 11 MG/DL (ref 7–17)
CALCIUM SPEC-MCNC: 9.5 MG/DL (ref 8.4–10.2)
CH: 33.2
CHCM: 33.2
CHLORIDE SERPL-SCNC: 103 MMOL/L (ref 98–107)
CO2 SERPL-SCNC: 27 MMOL/L (ref 22–30)
EOSINOPHIL # BLD AUTO: 0 K/UL (ref 0–0.7)
EOSINOPHIL NFR BLD AUTO: 0 %
ERYTHROCYTE [DISTWIDTH] IN BLOOD BY AUTOMATED COUNT: 3.9 M/UL (ref 3.8–5.4)
ERYTHROCYTE [DISTWIDTH] IN BLOOD: 13.3 % (ref 11.5–15.5)
GLUCOSE SERPL-MCNC: 99 MG/DL (ref 74–99)
HCT VFR BLD AUTO: 39.1 % (ref 34–46)
HDW: 2.21
HGB BLD-MCNC: 13 GM/DL (ref 11.4–16)
LUC NFR BLD AUTO: 0 %
LYMPHOCYTES # SPEC AUTO: 0.5 K/UL (ref 1–4.8)
LYMPHOCYTES NFR SPEC AUTO: 3 %
MCH RBC QN AUTO: 33.3 PG (ref 25–35)
MCHC RBC AUTO-ENTMCNC: 33.2 G/DL (ref 31–37)
MCV RBC AUTO: 100.3 FL (ref 80–100)
MONOCYTES # BLD AUTO: 0.5 K/UL (ref 0–1)
MONOCYTES NFR BLD AUTO: 3 %
NEUTROPHILS # BLD AUTO: 13.6 K/UL (ref 1.3–7.7)
NEUTROPHILS NFR BLD AUTO: 93 %
NON-AFRICAN AMERICAN GFR(MDRD): >60
POTASSIUM SERPL-SCNC: 4.4 MMOL/L (ref 3.5–5.1)
PROT SERPL-MCNC: 6.7 G/DL (ref 6.3–8.2)
SODIUM SERPL-SCNC: 139 MMOL/L (ref 137–145)
WBC # BLD AUTO: 0.06 10*3/UL
WBC # BLD AUTO: 14.7 K/UL (ref 3.8–10.6)
WBC (PEROX): 15.8

## 2017-07-28 RX ADMIN — GUAIFENESIN AND DEXTROMETHORPHAN HYDROBROMIDE SCH: 600; 30 TABLET, EXTENDED RELEASE ORAL at 21:42

## 2017-07-28 RX ADMIN — HYDROMORPHONE HYDROCHLORIDE PRN MG: 1 INJECTION, SOLUTION INTRAMUSCULAR; INTRAVENOUS; SUBCUTANEOUS at 16:05

## 2017-07-28 RX ADMIN — GUAIFENESIN AND DEXTROMETHORPHAN HYDROBROMIDE SCH EACH: 600; 30 TABLET, EXTENDED RELEASE ORAL at 16:08

## 2017-07-28 RX ADMIN — METHYLPREDNISOLONE SODIUM SUCCINATE SCH MG: 40 INJECTION, POWDER, FOR SOLUTION INTRAMUSCULAR; INTRAVENOUS at 17:33

## 2017-07-28 RX ADMIN — TIMOLOL MALEATE SCH DROPS: 5 SOLUTION OPHTHALMIC at 09:30

## 2017-07-28 RX ADMIN — HYDROCODONE BITARTRATE AND ACETAMINOPHEN PRN EACH: 5; 325 TABLET ORAL at 06:38

## 2017-07-28 RX ADMIN — HYDROMORPHONE HYDROCHLORIDE PRN MG: 1 INJECTION, SOLUTION INTRAMUSCULAR; INTRAVENOUS; SUBCUTANEOUS at 22:02

## 2017-07-28 RX ADMIN — HYDROMORPHONE HYDROCHLORIDE PRN MG: 1 INJECTION, SOLUTION INTRAMUSCULAR; INTRAVENOUS; SUBCUTANEOUS at 05:02

## 2017-07-28 RX ADMIN — IPRATROPIUM BROMIDE AND ALBUTEROL SULFATE SCH ML: .5; 3 SOLUTION RESPIRATORY (INHALATION) at 07:48

## 2017-07-28 RX ADMIN — HYDROMORPHONE HYDROCHLORIDE PRN MG: 1 INJECTION, SOLUTION INTRAMUSCULAR; INTRAVENOUS; SUBCUTANEOUS at 12:44

## 2017-07-28 RX ADMIN — NICOTINE SCH PATCH: 21 PATCH, EXTENDED RELEASE TRANSDERMAL at 17:33

## 2017-07-28 RX ADMIN — HYDROMORPHONE HYDROCHLORIDE PRN MG: 1 INJECTION, SOLUTION INTRAMUSCULAR; INTRAVENOUS; SUBCUTANEOUS at 18:48

## 2017-07-28 RX ADMIN — IPRATROPIUM BROMIDE AND ALBUTEROL SULFATE SCH ML: .5; 3 SOLUTION RESPIRATORY (INHALATION) at 11:29

## 2017-07-28 RX ADMIN — METHYLPREDNISOLONE SODIUM SUCCINATE SCH MG: 40 INJECTION, POWDER, FOR SOLUTION INTRAMUSCULAR; INTRAVENOUS at 23:53

## 2017-07-28 RX ADMIN — HYDROCODONE BITARTRATE AND ACETAMINOPHEN PRN EACH: 5; 325 TABLET ORAL at 17:45

## 2017-07-28 RX ADMIN — PANTOPRAZOLE SODIUM SCH MG: 40 TABLET, DELAYED RELEASE ORAL at 06:38

## 2017-07-28 RX ADMIN — METHYLPREDNISOLONE SODIUM SUCCINATE SCH MG: 40 INJECTION, POWDER, FOR SOLUTION INTRAMUSCULAR; INTRAVENOUS at 11:31

## 2017-07-28 RX ADMIN — HEPARIN SODIUM SCH UNIT: 5000 INJECTION, SOLUTION INTRAVENOUS; SUBCUTANEOUS at 21:42

## 2017-07-28 RX ADMIN — IPRATROPIUM BROMIDE AND ALBUTEROL SULFATE SCH: .5; 3 SOLUTION RESPIRATORY (INHALATION) at 15:14

## 2017-07-28 RX ADMIN — HEPARIN SODIUM SCH UNIT: 5000 INJECTION, SOLUTION INTRAVENOUS; SUBCUTANEOUS at 09:31

## 2017-07-28 RX ADMIN — AZITHROMYCIN SCH MG: 250 TABLET, FILM COATED ORAL at 17:33

## 2017-07-28 RX ADMIN — IPRATROPIUM BROMIDE AND ALBUTEROL SULFATE SCH ML: .5; 3 SOLUTION RESPIRATORY (INHALATION) at 19:31

## 2017-07-28 RX ADMIN — HYDROMORPHONE HYDROCHLORIDE PRN MG: 1 INJECTION, SOLUTION INTRAMUSCULAR; INTRAVENOUS; SUBCUTANEOUS at 09:39

## 2017-07-28 RX ADMIN — HYDROCODONE BITARTRATE AND ACETAMINOPHEN PRN EACH: 5; 325 TABLET ORAL at 23:53

## 2017-07-28 RX ADMIN — METHYLPREDNISOLONE SODIUM SUCCINATE SCH MG: 40 INJECTION, POWDER, FOR SOLUTION INTRAMUSCULAR; INTRAVENOUS at 05:02

## 2017-07-28 RX ADMIN — CEFAZOLIN SCH MLS/HR: 330 INJECTION, POWDER, FOR SOLUTION INTRAMUSCULAR; INTRAVENOUS at 06:37

## 2017-07-28 RX ADMIN — LATANOPROST SCH DROPS: 50 SOLUTION OPHTHALMIC at 21:42

## 2017-07-28 NOTE — PN
DATE OF SERVICE: 07/28/2017



This 52-year-old woman was admitted  pneumothorax is being closely monitored. 
Dr. Bolden is recommending CT scan of the chest. No chest pain. No 
palpitations. No fever. 



On exam, alert and oriented x3. Pulse 73, blood pressure 125/79, respiratory 
rate 16, temperature 97 degrees. 

HEENT: Conjunctive normal.

NECK: No jugular venous distention. 

CARDIOVASCULAR: S1, S2 muffled.

RESPIRATORY SYSTEM: Breath sounds diminished at the bases. A few scattered 
rhonchi.

ABDOMEN:  Soft. 

NERVOUS SYSTEM: No focal deficit. 

Heimlich valve is present. 



LABS: WBC 14.7.



ASSESSMENT:

1. Fall and left-sided pneumothorax, status post Heimlich valve.

2. Left-sided chest pain.

3. Chronic obstructive pulmonary disease. 

4. Glaucoma.

5. Increased white count. 



RECOMMENDATIONS AND DISCUSSION: I recommend to continue current medications, 
continue with symptomatic treatment. Closely follow with Dr. Bolden. CT scan. 
Incentive spirometry. Bronchodilators. Further recommendations to follow. 
[facility]

## 2017-07-28 NOTE — P.PN
Subjective








52-year-old female patient, chronic smoker, known history of COPD, recurrent 

bronchitis, was having symptoms of bronchitis and some mild symptoms of COPD 

exacerbation over the past 2 days.  She subsequently had a fall and her garage 

and she landed on her left chest and she developed significant left-sided chest 

pain increased shortness of breath.  Overnight she decided not to come into the 

hospital.  This morning her breathing was getting worse and she was having also 

increased congestions cough and wheezing.  For that reason, the patient came 

into the emergency department and she was found to have a 15-20% pneumothorax 

in the left apex.  No evidence of any rib fractures on the chest x-ray.  She 

was quite bronchospastic and wheezy and short of breath and tachycardic.  No 

signs of any tension pneumothorax and she was hemodynamically stable.  Pulse ox 

was around 95% on room air.  No hemoptysis.  She was however a having excess 

amount of pain along her left chest at the site of the trauma.  No chest wall 

deformity.  I attended on this patient in the emergency department.  Reviewed 

the chest x-ray.  Discussed with Dr. Hawkins and we inserted a Thoravent with 

successful reexpansion of the left lung.





The patient is seen again today 07/27/2017 in follow-up on the selective care 

unit.  She is awake and alert in no acute distress.  She is breathing easier 

today as compared to yesterday.  Her pain is well controlled.  She is 

maintaining good O2 saturations in the mid 90s on room air.  She's been 

afebrile.  Hemodynamically stable.  Her chest x-ray reveals a small apical 

pneumothorax on the left.  There is no clear evidence of rib fractures however 

on auscultation posteriorly on the left chest there is cracking and friction 

noted.  She does feel that in this area with a deep inhalation as well.  She 

remains on bronchodilators, IV Solu-Medrol and empiric antibiotics in the form 

of azithromycin.





On 07/28/2017, I'm seeing this patient in follow-up.  She is still having 

significant amount of pain along the left chest wall area.  There is a click 

that can be appreciated upon auscultation or generating from the left posterior 

chest area.  I suspect there is a left fracture probably a few.  The patient is 

still requiring a lot of IV Dilaudid for pain control.  She has a congested 

cough.  Unfortunately she is unable to bring up the sputum due to the ongoing 

pain that she's been experiencing.  She is less short of breath compared to 

yesterday.  The chest tube is still in place.  The chest x-ray from today 

showed adequate patient of the lungs without evidence of any pneumothorax and 

there is improved aeration of the lungs bilaterally.  The patient is pulling 

approximately 750 I incentive spirometer.  She is still very much concerned and 

she is very much concerned about an underlying pneumonia in addition.  She is 

on oral Zithromax for now.





Objective





- Vital Signs


Vital signs: 


 Vital Signs











Temp  97.0 F L  07/28/17 12:00


 


Pulse  73   07/28/17 12:00


 


Resp  16   07/28/17 12:00


 


BP  125/79   07/28/17 12:00


 


Pulse Ox  98   07/28/17 12:00








 Intake & Output











 07/27/17 07/28/17 07/28/17





 18:59 06:59 18:59


 


Intake Total 365 560 520


 


Balance 365 560 520


 


Weight  67.2 kg 


 


Intake:   


 


  IV  560 240


 


    Sodium Chloride 0.9% 1,  560 240





    000 ml @ 80 mls/hr IV .   





    [id number] [facility] Rx#:[id number]   


 


  Oral 365  280


 


Other:   


 


  Voiding Method  Toilet Toilet


 


  # Voids  1 














- Exam








Head exam was generally normal. There was no scleral icterus or corneal arcus. 

Mucous membranes were moist.Neck was supple and without jugular venous 

distension, thyromegaly, or carotid bruits. Carotids were easily palpable 

bilaterally. There was no adenopathy.  Lung sounds are diminished in the lung 

apex on the left compared to the right.  There is scattered rhonchi and 

expiratory wheezes throughout the lung fields bilaterally.Cardiac exam revealed 

the PMI to be normally situated and sized. The rhythm was regular and no 

extrasystoles were noted during several minutes of auscultation. The first and 

second heart sounds were normal and physiologic splitting of the second heart 

sound was noted. There were no murmurs, rubs, clicks, or gallops.Abdominal exam 

revealed normal bowel sounds. The abdomen was soft, non-tender, and without 

masses, organomegaly, or appreciable enlargement of the abdominal 

aorta.Examination of the extremities revealed easily palpable radial, femoral 

and pedal pulses. There was no cyanosis, clubbing or edema.








- Labs


CBC & Chem 7: 


 07/26/17 16:06





 07/26/17 16:06





Assessment and Plan


Plan: 





Assessment





1 traumatic left-sided pneumothorax 15-20% involving the left apex, status post 

successful insertion of a thoravent, the patient had a successful expansion of 

the left lung and the tube will be capped.





2 left-sided chest wall pain, traumatic, rule out underlying pulmonary contusion





3 COPD exacerbation/symptoms of acute bronchitis





4 shortness of breath secondary to above





5 smoker





6 glaucoma





Plan





Proceed with a CAT scan of the chest looking for any rib fracture or pulmonary 

contusion.  We'll also evaluate the extent of emphysema by the computed 

tomography scan images.  We will cap the thoravent.  Will possibly remove the 

tube in a.m.  Dilaudid for pain control.  Incentive spirometer.  Oral 

Zithromax.  Bronchodilators.  Systemic steroids.  Nicotine patches.  We'll 

follow.  Patient is ambulating.

## 2017-07-28 NOTE — XR
EXAMINATION TYPE: XR chest 1V

 

DATE OF EXAM: 7/28/2017

 

HISTORY: ptx.

 

REFERENCE: Previous study dated 7/27/2017.

 

FINDINGS: There is a Heimlich valve in place on the left. No definite pneumothorax is seen. Heart siz
e is upper limits of normal. There is a left-sided effusion. There is left basilar airspace disease. 
Overall aeration of the left lung may have improved slightly.

 

IMPRESSION: 

1. APPARENT RESOLUTION OF THE PATIENT'S LEFT APICAL PNEUMOTHORAX.

2. IMPROVED AERATION, LEFT LUNG BASE.

3. SMALL LEFT EFFUSION.

## 2017-07-28 NOTE — CT
EXAMINATION TYPE: CT chest w con

 

DATE OF EXAM: 7/28/2017

 

COMPARISON: NONE

 

HISTORY: Pneumothorax and broken ribs.

 

CT DLP: 276.1 mGycm

Automated exposure control for dose reduction was used.

 

CONTRAST: 

CT scan of the chest is performed with IV Contrast, patient injected with 90 mL of Omnipaque 300.

 

FINDINGS: There are undisplaced fractures of the left seventh eighth and ninth ribs posteriorly. Ther
e is a comminuted, mildly displaced fracture of the left sixth rib.

 

There is a Heimlich valve in place on the left. There is no evidence of pneumothorax. There is subcut
aneous emphysema over the anterior chest on the left.

 

There are patchy groundglass opacities present bilaterally. This may represent pulmonary contusion is
 thinning of the age of the trauma. There is dense consolidation in the left lower lobe.

 

There is evidence of old granulomatous disease within the lungs. There is atelectasis at both lung ba
ses, greater on the left than the right containing punctate calcifications related to old granulomato
us disease.

 

There is no significant axillary, internal mammary, mediastinal or left hilar adenopathy. There is a 
1.2 cm lymph node in the right hilum.

 

There are bilateral breast implants in place.

 

Visualized portions of the upper abdomen are unremarkable.

 

There is minimal hypertrophic spondylosis within the spine.

 

IMPRESSION:  

1. Fractures of the left posterior sixth, seventh, eighth and ninth ribs. The sixth rib is displaced.


2. Patchy groundglass opacity present bilaterally. This may relate to pulmonary hemorrhage. Alveoliti
s is also a possibility.

3. Evidence of old granulomatous disease.

## 2017-07-29 RX ADMIN — LEVOFLOXACIN SCH MLS/HR: 750 INJECTION, SOLUTION INTRAVENOUS at 19:54

## 2017-07-29 RX ADMIN — IPRATROPIUM BROMIDE AND ALBUTEROL SULFATE SCH ML: .5; 3 SOLUTION RESPIRATORY (INHALATION) at 19:28

## 2017-07-29 RX ADMIN — HYDROMORPHONE HYDROCHLORIDE PRN MG: 1 INJECTION, SOLUTION INTRAMUSCULAR; INTRAVENOUS; SUBCUTANEOUS at 20:09

## 2017-07-29 RX ADMIN — IPRATROPIUM BROMIDE AND ALBUTEROL SULFATE SCH ML: .5; 3 SOLUTION RESPIRATORY (INHALATION) at 12:58

## 2017-07-29 RX ADMIN — HYDROCODONE BITARTRATE AND ACETAMINOPHEN PRN EACH: 5; 325 TABLET ORAL at 13:46

## 2017-07-29 RX ADMIN — NICOTINE SCH PATCH: 21 PATCH, EXTENDED RELEASE TRANSDERMAL at 17:08

## 2017-07-29 RX ADMIN — IPRATROPIUM BROMIDE AND ALBUTEROL SULFATE SCH ML: .5; 3 SOLUTION RESPIRATORY (INHALATION) at 16:35

## 2017-07-29 RX ADMIN — POLYETHYLENE GLYCOL 3350 SCH GM: 17 POWDER, FOR SOLUTION ORAL at 12:41

## 2017-07-29 RX ADMIN — DOCUSATE SODIUM SCH MG: 100 CAPSULE, LIQUID FILLED ORAL at 19:55

## 2017-07-29 RX ADMIN — DEXTROSE MONOHYDRATE SCH MLS/HR: 5 INJECTION, SOLUTION INTRAVENOUS at 21:26

## 2017-07-29 RX ADMIN — GUAIFENESIN AND DEXTROMETHORPHAN HYDROBROMIDE SCH: 600; 30 TABLET, EXTENDED RELEASE ORAL at 07:47

## 2017-07-29 RX ADMIN — HYDROMORPHONE HYDROCHLORIDE PRN MG: 1 INJECTION, SOLUTION INTRAMUSCULAR; INTRAVENOUS; SUBCUTANEOUS at 23:15

## 2017-07-29 RX ADMIN — HEPARIN SODIUM SCH UNIT: 5000 INJECTION, SOLUTION INTRAVENOUS; SUBCUTANEOUS at 19:56

## 2017-07-29 RX ADMIN — HYDROMORPHONE HYDROCHLORIDE PRN MG: 1 INJECTION, SOLUTION INTRAMUSCULAR; INTRAVENOUS; SUBCUTANEOUS at 04:03

## 2017-07-29 RX ADMIN — HYDROMORPHONE HYDROCHLORIDE PRN MG: 1 INJECTION, SOLUTION INTRAMUSCULAR; INTRAVENOUS; SUBCUTANEOUS at 13:46

## 2017-07-29 RX ADMIN — AZITHROMYCIN SCH MG: 250 TABLET, FILM COATED ORAL at 17:09

## 2017-07-29 RX ADMIN — HYDROCODONE BITARTRATE AND ACETAMINOPHEN PRN EACH: 5; 325 TABLET ORAL at 07:57

## 2017-07-29 RX ADMIN — METHYLPREDNISOLONE SODIUM SUCCINATE SCH MG: 40 INJECTION, POWDER, FOR SOLUTION INTRAMUSCULAR; INTRAVENOUS at 23:15

## 2017-07-29 RX ADMIN — LATANOPROST SCH DROPS: 50 SOLUTION OPHTHALMIC at 19:56

## 2017-07-29 RX ADMIN — DOCUSATE SODIUM SCH MG: 100 CAPSULE, LIQUID FILLED ORAL at 12:41

## 2017-07-29 RX ADMIN — HYDROMORPHONE HYDROCHLORIDE PRN MG: 1 INJECTION, SOLUTION INTRAMUSCULAR; INTRAVENOUS; SUBCUTANEOUS at 01:02

## 2017-07-29 RX ADMIN — HYDROMORPHONE HYDROCHLORIDE PRN MG: 1 INJECTION, SOLUTION INTRAMUSCULAR; INTRAVENOUS; SUBCUTANEOUS at 10:45

## 2017-07-29 RX ADMIN — HYDROMORPHONE HYDROCHLORIDE PRN MG: 1 INJECTION, SOLUTION INTRAMUSCULAR; INTRAVENOUS; SUBCUTANEOUS at 17:02

## 2017-07-29 RX ADMIN — GUAIFENESIN AND DEXTROMETHORPHAN HYDROBROMIDE SCH: 600; 30 TABLET, EXTENDED RELEASE ORAL at 19:55

## 2017-07-29 RX ADMIN — DEXTROSE MONOHYDRATE SCH: 5 INJECTION, SOLUTION INTRAVENOUS at 23:14

## 2017-07-29 RX ADMIN — TIMOLOL MALEATE SCH DROPS: 5 SOLUTION OPHTHALMIC at 07:48

## 2017-07-29 RX ADMIN — METHYLPREDNISOLONE SODIUM SUCCINATE SCH MG: 40 INJECTION, POWDER, FOR SOLUTION INTRAMUSCULAR; INTRAVENOUS at 07:00

## 2017-07-29 RX ADMIN — HYDROMORPHONE HYDROCHLORIDE PRN MG: 1 INJECTION, SOLUTION INTRAMUSCULAR; INTRAVENOUS; SUBCUTANEOUS at 06:59

## 2017-07-29 RX ADMIN — METHYLPREDNISOLONE SODIUM SUCCINATE SCH MG: 40 INJECTION, POWDER, FOR SOLUTION INTRAMUSCULAR; INTRAVENOUS at 10:46

## 2017-07-29 RX ADMIN — PANTOPRAZOLE SODIUM SCH MG: 40 TABLET, DELAYED RELEASE ORAL at 07:03

## 2017-07-29 RX ADMIN — ACETYLCYSTEINE SCH MG: 200 SOLUTION ORAL; RESPIRATORY (INHALATION) at 19:28

## 2017-07-29 RX ADMIN — IPRATROPIUM BROMIDE AND ALBUTEROL SULFATE SCH ML: .5; 3 SOLUTION RESPIRATORY (INHALATION) at 08:40

## 2017-07-29 RX ADMIN — METHYLPREDNISOLONE SODIUM SUCCINATE SCH MG: 40 INJECTION, POWDER, FOR SOLUTION INTRAMUSCULAR; INTRAVENOUS at 17:10

## 2017-07-29 RX ADMIN — HEPARIN SODIUM SCH UNIT: 5000 INJECTION, SOLUTION INTRAVENOUS; SUBCUTANEOUS at 07:49

## 2017-07-29 RX ADMIN — HYDROCODONE BITARTRATE AND ACETAMINOPHEN PRN EACH: 5; 325 TABLET ORAL at 19:55

## 2017-07-29 NOTE — P.PN
Subjective





52-year-old female patient, chronic smoker, known history of COPD, recurrent 

bronchitis, was having symptoms of bronchitis and some mild symptoms of COPD 

exacerbation over the past 2 days.  She subsequently had a fall and her garage 

and she landed on her left chest and she developed significant left-sided chest 

pain increased shortness of breath.  Overnight she decided not to come into the 

hospital.  This morning her breathing was getting worse and she was having also 

increased congestions cough and wheezing.  For that reason, the patient came 

into the emergency department and she was found to have a 15-20% pneumothorax 

in the left apex.  No evidence of any rib fractures on the chest x-ray.  She 

was quite bronchospastic and wheezy and short of breath and tachycardic.  No 

signs of any tension pneumothorax and she was hemodynamically stable.  Pulse ox 

was around 95% on room air.  No hemoptysis.  She was however a having excess 

amount of pain along her left chest at the site of the trauma.  No chest wall 

deformity.  I attended on this patient in the emergency department.  Reviewed 

the chest x-ray.  Discussed with Dr. Hawkins and we inserted a Thoravent with 

successful reexpansion of the left lung.





The patient is seen again today 07/27/2017 in follow-up on the selective care 

unit.  She is awake and alert in no acute distress.  She is breathing easier 

today as compared to yesterday.  Her pain is well controlled.  She is 

maintaining good O2 saturations in the mid 90s on room air.  She's been 

afebrile.  Hemodynamically stable.  Her chest x-ray reveals a small apical 

pneumothorax on the left.  There is no clear evidence of rib fractures however 

on auscultation posteriorly on the left chest there is cracking and friction 

noted.  She does feel that in this area with a deep inhalation as well.  She 

remains on bronchodilators, IV Solu-Medrol and empiric antibiotics in the form 

of azithromycin.





On 07/28/2017, I'm seeing this patient in follow-up.  She is still having 

significant amount of pain along the left chest wall area.  There is a click 

that can be appreciated upon auscultation or generating from the left posterior 

chest area.  I suspect there is a left fracture probably a few.  The patient is 

still requiring a lot of IV Dilaudid for pain control.  She has a congested 

cough.  Unfortunately she is unable to bring up the sputum due to the ongoing 

pain that she's been experiencing.  She is less short of breath compared to 

yesterday.  The chest tube is still in place.  The chest x-ray from today 

showed adequate patient of the lungs without evidence of any pneumothorax and 

there is improved aeration of the lungs bilaterally.  The patient is pulling 

approximately 750 I incentive spirometer.  She is still very much concerned and 

she is very much concerned about an underlying pneumonia in addition.  She is 

on oral Zithromax for now.





The patient is seen again today 07/29/2017 in follow-up on the selective care 

unit.  She is awake and alert in no acute distress.  She states she is 

breathing easier today as compared to yesterday.  Her pain is better 

controlled.  She still has discomfort on deep inhalation.  She is pulling 

approximately 1000 mls on the incentive spirometer.  Her chest x-ray continues 

to show atelectatic changes in the left lung base.  No significant 

pneumothorax.  ThoraVent remains in place.  She has been up ambulating in the 

hallway without distress.  Maintaining taking good O2 saturations in the 90s on 

room air.  Afebrile.











Objective





- Vital Signs


Vital signs: 


 Vital Signs











Temp  98.0 F   07/29/17 11:56


 


Pulse  76   07/29/17 12:58


 


Resp  18   07/29/17 11:56


 


BP  122/80   07/29/17 11:56


 


Pulse Ox  93 L  07/29/17 11:56








 Intake & Output











 07/28/17 07/29/17 07/29/17





 18:59 06:59 18:59


 


Intake Total 940  600


 


Balance 940  600


 


Weight  67.5 kg 


 


Intake:   


 


    


 


    Sodium Chloride 0.9% 1, 480  





    000 ml @ 80 mls/hr IV .   





    [id number] [facility] Rx#:[id number]   


 


  Oral 460  600


 


Other:   


 


  Voiding Method Toilet Toilet Toilet


 


  # Voids  2 2


 


  # Bowel Movements   0














- Exam





Head exam was generally normal. There was no scleral icterus or corneal arcus. 

Mucous membranes were moist.Neck was supple and without jugular venous 

distension, thyromegaly, or carotid bruits. Carotids were easily palpable 

bilaterally. There was no adenopathy.  Lung sounds are diminished in the lung 

apex on the left compared to the right.  There is scattered rhonchi and 

expiratory wheezes throughout the lung fields bilaterally.Cardiac exam revealed 

the PMI to be normally situated and sized. The rhythm was regular and no 

extrasystoles were noted during several minutes of auscultation. The first and 

second heart sounds were normal and physiologic splitting of the second heart 

sound was noted. There were no murmurs, rubs, clicks, or gallops.Abdominal exam 

revealed normal bowel sounds. The abdomen was soft, non-tender, and without 

masses, organomegaly, or appreciable enlargement of the abdominal 

aorta.Examination of the extremities revealed easily palpable radial, femoral 

and pedal pulses. There was no cyanosis, clubbing or edema.





- Labs


CBC & Chem 7: 


 07/28/17 14:07





 07/28/17 14:07


Labs: 


 Abnormal Lab Results - Last 24 Hours (Table)











  07/28/17 Range/Units





  14:07 


 


WBC  14.7 H  (3.8-10.6)  k/uL


 


MCV  100.3 H  (80.0-100.0)  fL


 


Neutrophils #  13.6 H  (1.3-7.7)  k/uL


 


Lymphocytes #  0.5 L  (1.0-4.8)  k/uL














Assessment and Plan


Plan: 





Assessment:





1 traumatic left-sided pneumothorax 15-20% involving the left apex, status post 

successful insertion of a thoravent, small minimal apical thorax remains.





2 left-sided chest wall pain, traumatic, rule out underlying pulmonary contusion

, suspect left posterior fractured rib.





3 COPD exacerbation/symptoms of acute bronchitis





4 shortness of breath secondary to above





5 smoker





6 glaucoma





Plan:





The patient was seen and evaluated by Dr. Rollins.  Her chest x-ray was 

reviewed.  No sizable pneumothorax.  We'll remove the Thoravent today. We'll 

continue with her pain medications. We'll continue treating her COPD 

exacerbation with bronchodilators, IV Solu-Medrol and antibiotics.  We will 

increase her activity as tolerated.  We'll continue a NicoDerm patch.  She is 

again educated regarding the importance of complete smoking cessation.  She is 

encouraged regarding the incentive spirometer and cough and deep breathing 

exercises as tolerated.  We'll continue to follow.

## 2017-07-29 NOTE — XR
EXAMINATION TYPE: XR chest 1V

 

DATE OF EXAM: 7/29/2017

 

COMPARISON: Prior chest x-ray and chest CT 7/28/2017

 

HISTORY: Chest tube

 

TECHNIQUE: Single frontal view of the chest is obtained.

 

FINDINGS:  Findings are similar to prior exam. Thoracic vent is present with tubing coursing inferior
ly. Some parenchymal density suspected in left upper lobe somewhat obscured by overlying dressing. No
 sizable pneumothorax. Retrocardiac density is present.

 

IMPRESSION:  Left lower lobe atelectasis, correlate to exclude pneumonia. No sizable pneumothorax wit
h chest tube in place as described.

## 2017-07-30 LAB
ANION GAP SERPL CALC-SCNC: 10 MMOL/L
BASOPHILS # BLD AUTO: 0 K/UL (ref 0–0.2)
BASOPHILS NFR BLD AUTO: 0 %
BUN SERPL-SCNC: 15 MG/DL (ref 7–17)
CALCIUM SPEC-MCNC: 9.1 MG/DL (ref 8.4–10.2)
CH: 33.2
CHCM: 32.8
CHLORIDE SERPL-SCNC: 101 MMOL/L (ref 98–107)
CO2 SERPL-SCNC: 26 MMOL/L (ref 22–30)
EOSINOPHIL # BLD AUTO: 0 K/UL (ref 0–0.7)
EOSINOPHIL NFR BLD AUTO: 0 %
ERYTHROCYTE [DISTWIDTH] IN BLOOD BY AUTOMATED COUNT: 3.88 M/UL (ref 3.8–5.4)
ERYTHROCYTE [DISTWIDTH] IN BLOOD: 13.3 % (ref 11.5–15.5)
GLUCOSE SERPL-MCNC: 119 MG/DL (ref 74–99)
HCT VFR BLD AUTO: 39.5 % (ref 34–46)
HDW: 2.18
HGB BLD-MCNC: 12.9 GM/DL (ref 11.4–16)
LUC NFR BLD AUTO: 1 %
LYMPHOCYTES # SPEC AUTO: 0.8 K/UL (ref 1–4.8)
LYMPHOCYTES NFR SPEC AUTO: 7 %
MCH RBC QN AUTO: 33.1 PG (ref 25–35)
MCHC RBC AUTO-ENTMCNC: 32.5 G/DL (ref 31–37)
MCV RBC AUTO: 101.8 FL (ref 80–100)
MONOCYTES # BLD AUTO: 0.6 K/UL (ref 0–1)
MONOCYTES NFR BLD AUTO: 5 %
NEUTROPHILS # BLD AUTO: 10.2 K/UL (ref 1.3–7.7)
NEUTROPHILS NFR BLD AUTO: 87 %
NON-AFRICAN AMERICAN GFR(MDRD): >60
POTASSIUM SERPL-SCNC: 4.8 MMOL/L (ref 3.5–5.1)
SODIUM SERPL-SCNC: 137 MMOL/L (ref 137–145)
WBC # BLD AUTO: 0.14 10*3/UL
WBC # BLD AUTO: 11.8 K/UL (ref 3.8–10.6)
WBC (PEROX): 11.86

## 2017-07-30 RX ADMIN — ACETYLCYSTEINE SCH MG: 200 SOLUTION ORAL; RESPIRATORY (INHALATION) at 20:25

## 2017-07-30 RX ADMIN — HYDROCODONE BITARTRATE AND ACETAMINOPHEN PRN EACH: 5; 325 TABLET ORAL at 20:44

## 2017-07-30 RX ADMIN — HYDROMORPHONE HYDROCHLORIDE PRN MG: 1 INJECTION, SOLUTION INTRAMUSCULAR; INTRAVENOUS; SUBCUTANEOUS at 23:41

## 2017-07-30 RX ADMIN — HYDROMORPHONE HYDROCHLORIDE PRN MG: 1 INJECTION, SOLUTION INTRAMUSCULAR; INTRAVENOUS; SUBCUTANEOUS at 02:01

## 2017-07-30 RX ADMIN — HYDROMORPHONE HYDROCHLORIDE PRN MG: 1 INJECTION, SOLUTION INTRAMUSCULAR; INTRAVENOUS; SUBCUTANEOUS at 04:58

## 2017-07-30 RX ADMIN — HYDROMORPHONE HYDROCHLORIDE PRN MG: 1 INJECTION, SOLUTION INTRAMUSCULAR; INTRAVENOUS; SUBCUTANEOUS at 14:32

## 2017-07-30 RX ADMIN — HYDROMORPHONE HYDROCHLORIDE PRN MG: 1 INJECTION, SOLUTION INTRAMUSCULAR; INTRAVENOUS; SUBCUTANEOUS at 20:43

## 2017-07-30 RX ADMIN — IPRATROPIUM BROMIDE AND ALBUTEROL SULFATE SCH ML: .5; 3 SOLUTION RESPIRATORY (INHALATION) at 08:11

## 2017-07-30 RX ADMIN — DEXTROSE MONOHYDRATE SCH MLS/HR: 5 INJECTION, SOLUTION INTRAVENOUS at 23:40

## 2017-07-30 RX ADMIN — DOCUSATE SODIUM SCH MG: 100 CAPSULE, LIQUID FILLED ORAL at 19:49

## 2017-07-30 RX ADMIN — ACETYLCYSTEINE SCH MG: 200 SOLUTION ORAL; RESPIRATORY (INHALATION) at 08:12

## 2017-07-30 RX ADMIN — HYDROCODONE BITARTRATE AND ACETAMINOPHEN PRN EACH: 5; 325 TABLET ORAL at 12:46

## 2017-07-30 RX ADMIN — METHYLPREDNISOLONE SODIUM SUCCINATE SCH MG: 40 INJECTION, POWDER, FOR SOLUTION INTRAMUSCULAR; INTRAVENOUS at 10:57

## 2017-07-30 RX ADMIN — HYDROCODONE BITARTRATE AND ACETAMINOPHEN PRN EACH: 5; 325 TABLET ORAL at 16:51

## 2017-07-30 RX ADMIN — DEXTROSE MONOHYDRATE SCH MLS/HR: 5 INJECTION, SOLUTION INTRAVENOUS at 08:04

## 2017-07-30 RX ADMIN — HYDROMORPHONE HYDROCHLORIDE PRN MG: 1 INJECTION, SOLUTION INTRAMUSCULAR; INTRAVENOUS; SUBCUTANEOUS at 17:40

## 2017-07-30 RX ADMIN — HYDROCODONE BITARTRATE AND ACETAMINOPHEN PRN EACH: 5; 325 TABLET ORAL at 08:03

## 2017-07-30 RX ADMIN — POLYETHYLENE GLYCOL 3350 SCH GM: 17 POWDER, FOR SOLUTION ORAL at 08:04

## 2017-07-30 RX ADMIN — GUAIFENESIN AND DEXTROMETHORPHAN HYDROBROMIDE SCH: 600; 30 TABLET, EXTENDED RELEASE ORAL at 19:49

## 2017-07-30 RX ADMIN — METHYLPREDNISOLONE SODIUM SUCCINATE SCH MG: 40 INJECTION, POWDER, FOR SOLUTION INTRAMUSCULAR; INTRAVENOUS at 17:40

## 2017-07-30 RX ADMIN — LATANOPROST SCH DROPS: 50 SOLUTION OPHTHALMIC at 19:49

## 2017-07-30 RX ADMIN — DOCUSATE SODIUM SCH MG: 100 CAPSULE, LIQUID FILLED ORAL at 08:04

## 2017-07-30 RX ADMIN — METHYLPREDNISOLONE SODIUM SUCCINATE SCH MG: 40 INJECTION, POWDER, FOR SOLUTION INTRAMUSCULAR; INTRAVENOUS at 23:41

## 2017-07-30 RX ADMIN — NICOTINE SCH PATCH: 21 PATCH, EXTENDED RELEASE TRANSDERMAL at 17:40

## 2017-07-30 RX ADMIN — ACETYLCYSTEINE SCH MG: 200 SOLUTION ORAL; RESPIRATORY (INHALATION) at 11:45

## 2017-07-30 RX ADMIN — PANTOPRAZOLE SODIUM SCH MG: 40 TABLET, DELAYED RELEASE ORAL at 06:53

## 2017-07-30 RX ADMIN — HYDROMORPHONE HYDROCHLORIDE PRN MG: 1 INJECTION, SOLUTION INTRAMUSCULAR; INTRAVENOUS; SUBCUTANEOUS at 08:02

## 2017-07-30 RX ADMIN — TIMOLOL MALEATE SCH DROPS: 5 SOLUTION OPHTHALMIC at 08:06

## 2017-07-30 RX ADMIN — DEXTROSE MONOHYDRATE SCH MLS/HR: 5 INJECTION, SOLUTION INTRAVENOUS at 15:00

## 2017-07-30 RX ADMIN — IPRATROPIUM BROMIDE AND ALBUTEROL SULFATE SCH ML: .5; 3 SOLUTION RESPIRATORY (INHALATION) at 15:53

## 2017-07-30 RX ADMIN — GUAIFENESIN AND DEXTROMETHORPHAN HYDROBROMIDE SCH: 600; 30 TABLET, EXTENDED RELEASE ORAL at 08:06

## 2017-07-30 RX ADMIN — HYDROMORPHONE HYDROCHLORIDE PRN MG: 1 INJECTION, SOLUTION INTRAMUSCULAR; INTRAVENOUS; SUBCUTANEOUS at 11:01

## 2017-07-30 RX ADMIN — HEPARIN SODIUM SCH UNIT: 5000 INJECTION, SOLUTION INTRAVENOUS; SUBCUTANEOUS at 08:05

## 2017-07-30 RX ADMIN — METHYLPREDNISOLONE SODIUM SUCCINATE SCH MG: 40 INJECTION, POWDER, FOR SOLUTION INTRAMUSCULAR; INTRAVENOUS at 06:53

## 2017-07-30 RX ADMIN — HEPARIN SODIUM SCH UNIT: 5000 INJECTION, SOLUTION INTRAVENOUS; SUBCUTANEOUS at 19:49

## 2017-07-30 RX ADMIN — HYDROCODONE BITARTRATE AND ACETAMINOPHEN PRN EACH: 5; 325 TABLET ORAL at 01:52

## 2017-07-30 RX ADMIN — IPRATROPIUM BROMIDE AND ALBUTEROL SULFATE SCH ML: .5; 3 SOLUTION RESPIRATORY (INHALATION) at 11:45

## 2017-07-30 RX ADMIN — IPRATROPIUM BROMIDE AND ALBUTEROL SULFATE SCH ML: .5; 3 SOLUTION RESPIRATORY (INHALATION) at 20:24

## 2017-07-30 RX ADMIN — LEVOFLOXACIN SCH MLS/HR: 750 INJECTION, SOLUTION INTRAVENOUS at 18:57

## 2017-07-30 NOTE — PN
DATE OF SERVICE:  07/29/17





This  52-year-old woman was admitted with pneumothorax, also had possible 
pneumonia too also.  The patient is being closely monitored.  CT scan and chest 
x-ray results were reviewed.   No palpitations. No fever.   Chest pain has been 
complained of. 



PHYSICAL EXAMINATION:  On exam, pulse 87.  Blood pressure 122/80.   Respiratory 
rate 18. Temperature 98 degrees.   Pulse ox 93% on 2 L.  HEENT: Conjunctivae 
normal.  NECK: No JVD.    Cardiovascular: S1, S2.    Respiratory: Breath sounds 
diminished at the bases.     A few scattered rhonchi and no crackles.    
Abdomen soft.   Nontender.    Legs: No edema. No swelling.  Nervous system:   
No focal deficits. 



LABS: WBC 14.7.



ASSESSMENT:

1.   Fall and left sided pneumothorax, status post Heimlich valve. 

2.   Possible bronchopneumonia. 

3.   Left sided chest pain.

4.   Multiple rib fractures on the left. 

5.   Chronic obstructive pulmonary disease.

6.   Glaucoma. 

7.   Increased WBC.



RECOMMENDATIONS AND DISCUSSION:    Continue the current medications, continue 
with symptomatic treatment.  Otherwise, at this time, we will monitor the 
patient closely.   Pain medications and closely follow with Dr. Rollins and Dr. Bolden.  CT scan noted.  Further recommendations to follow.     
PHYLLISD

## 2017-07-30 NOTE — P.PN
Subjective


Principal diagnosis: 





Left sided pneumothorax and left sided pneumonia, and multiple rib fractures.


52-year-old female patient, chronic smoker, known history of COPD, recurrent 

bronchitis, was having symptoms of bronchitis and some mild symptoms of COPD 

exacerbation over the past 2 days.  She subsequently had a fall and her garage 

and she landed on her left chest and she developed significant left-sided chest 

pain increased shortness of breath.  Overnight she decided not to come into the 

hospital.  This morning her breathing was getting worse and she was having also 

increased congestions cough and wheezing.  For that reason, the patient came 

into the emergency department and she was found to have a 15-20% pneumothorax 

in the left apex.  No evidence of any rib fractures on the chest x-ray.  She 

was quite bronchospastic and wheezy and short of breath and tachycardic.  No 

signs of any tension pneumothorax and she was hemodynamically stable.  Pulse ox 

was around 95% on room air.  No hemoptysis.  She was however a having excess 

amount of pain along her left chest at the site of the trauma.  No chest wall 

deformity.  I attended on this patient in the emergency department.  Reviewed 

the chest x-ray.  Discussed with Dr. Hawkins and we inserted a Thoravent with 

successful reexpansion of the left lung.





The patient is seen again today 07/27/2017 in follow-up on the [facility] 

unit.  She is awake and alert in no acute distress.  She is breathing easier 

today as compared to yesterday.  Her pain is well controlled.  She is 

maintaining good O2 saturations in the mid 90s on room air.  She's been 

afebrile.  Hemodynamically stable.  Her chest x-ray reveals a small apical 

pneumothorax on the left.  There is no clear evidence of rib fractures however 

on auscultation posteriorly on the left chest there is cracking and friction 

noted.  She does feel that in this area with a deep inhalation as well.  She 

remains on bronchodilators, IV Solu-Medrol and empiric antibiotics in the form 

of azithromycin.





On 07/28/2017, I'm seeing this patient in follow-up.  She is still having 

significant amount of pain along the left chest wall area.  There is a click 

that can be appreciated upon auscultation or generating from the left posterior 

chest area.  I suspect there is a left fracture probably a few.  The patient is 

still requiring a lot of IV Dilaudid for pain control.  She has a congested 

cough.  Unfortunately she is unable to bring up the sputum due to the ongoing 

pain that she's been experiencing.  She is less short of breath compared to 

yesterday.  The chest tube is still in place.  The chest x-ray from today 

showed adequate patient of the lungs without evidence of any pneumothorax and 

there is improved aeration of the lungs bilaterally.  The patient is pulling 

approximately 750 I incentive spirometer.  She is still very much concerned and 

she is very much concerned about an underlying pneumonia in addition.  She is 

on oral Zithromax for now.





The patient is seen again today 07/29/2017 in follow-up on the [facility] care 

unit.  She is awake and alert in no acute distress.  She states she is 

breathing easier today as compared to yesterday.  Her pain is better 

controlled.  She still has discomfort on deep inhalation.  She is pulling 

approximately 1000 mls on the incentive spirometer.  Her chest x-ray continues 

to show atelectatic changes in the left lung base.  No significant 

pneumothorax.  ThoraVent remains in place.  She has been up ambulating in the 

hallway without distress.  Maintaining taking good O2 saturations in the 90s on 

room air.  Afebrile.





Patient was seen today on 7/30/2017, seems to be doing a bit better compared to 

yesterday.  Her thoravent has been removed yesterday.  Patient continues to 

have some cough, but unable to clear her sputum.  Yesterday antibiotics were 

changed and I had a chance to review the CT of the chest as well as the x-rays 

with the patient and her .  Today there is definite improvement, pain 

seems to be fairly well controlled, and her lungs even sound better compared to 

how they sounded yesterday.  White count seems to be coming down.  Chest x-ray 

was ordered to be done in a.m.











Objective





- Vital Signs


Vital signs: 


 Vital Signs











Temp  98.1 F   07/30/17 08:25


 


Pulse  74   07/30/17 08:25


 


Resp  18   07/30/17 08:25


 


BP  141/89   07/30/17 08:25


 


Pulse Ox  95   07/30/17 08:25








 Intake & Output











 07/29/17 07/30/17 07/30/17





 18:59 06:59 18:59


 


Intake Total 980  50


 


Balance 980  50


 


Weight  67.8 kg 


 


Intake:   


 


  Intake, IV Titration   50





  Amount   


 


    Piperacillin-Tazobactam 3   50





    .375 gm In Dextrose/Water   





    1 50ml.bag @ 12.5 mls/hr   





    [facility]   





    [id number]   


 


  Oral 980  


 


Other:   


 


  Voiding Method Toilet Toilet 


 


  # Voids 2 2 1


 


  # Bowel Movements 0  














- Exam





Head exam was generally normal. There was no scleral icterus or corneal arcus. 

Mucous membranes were moist.Neck was supple and without jugular venous 

distension, thyromegaly, or carotid bruits. Carotids were easily palpable 

bilaterally. There was no adenopathy.  Lung sounds are diminished in the lung 

apex on the left compared to the right.  Sterile dressing is noted over the 

area where the chest tube was inserted on admission.  There is scattered 

rhonchi and expiratory wheezes throughout the lung fields bilaterally.Cardiac 

exam revealed the PMI to be normally situated and sized. The rhythm was regular 

and no extrasystoles were noted during several minutes of auscultation. The 

first and second heart sounds were normal and physiologic splitting of the 

second heart sound was noted. There were no murmurs, rubs, clicks, or 

gallops.Abdominal exam revealed normal bowel sounds. The abdomen was soft, non-

tender, and without masses, organomegaly, or appreciable enlargement of the 

abdominal aorta.Examination of the extremities revealed easily palpable radial, 

femoral and pedal pulses. There was no cyanosis, clubbing or edema.








- Labs


CBC & Chem 7: 


 07/30/17 05:52





 07/30/17 05:52


Labs: 


 Abnormal Lab Results - Last 24 Hours (Table)











  07/30/17 07/30/17 Range/Units





  05:52 05:52 


 


WBC  11.8 H   (3.8-10.6)  k/uL


 


MCV  101.8 H   (80.0-100.0)  fL


 


Neutrophils #  10.2 H   (1.3-7.7)  k/uL


 


Lymphocytes #  0.8 L   (1.0-4.8)  k/uL


 


Glucose   119 H  (74-99)  mg/dL














Assessment and Plan


Plan: 





1 traumatic left-sided pneumothorax 15-20% involving the left apex, status post 

successful insertion of a thoravent, small minimal apical thorax remains.





2 left-sided chest wall pain, traumatic, rule out underlying pulmonary contusion

, suspect left posterior fractured rib.





3 COPD exacerbation/symptoms of acute bronchitis





4 shortness of breath secondary to above





5 smoker





6 glaucoma





7 acute nosocomial pneumonia, based on the chest x-ray and CT of the chest, 

hence antibiotics were changed yesterday and she is presently on Levaquin and 

Zosyn.





Recommendation: Continue present treatment plan including antibiotics, 

bronchodilators, incentive spirometry, mucolytic's, steroids, patient is now 

ready for discharge planning, chest x-ray was ordered to be done in a.m., and 

based on the chest x-ray findings further decisions and recommendations will be 

made.  Again discussed the findings on the CT of the chest with the patient and 

her  today.








Time with Patient: Less than 30

## 2017-07-31 RX ADMIN — ACETYLCYSTEINE SCH MG: 200 SOLUTION ORAL; RESPIRATORY (INHALATION) at 11:29

## 2017-07-31 RX ADMIN — NICOTINE SCH PATCH: 21 PATCH, EXTENDED RELEASE TRANSDERMAL at 17:05

## 2017-07-31 RX ADMIN — METHYLPREDNISOLONE SODIUM SUCCINATE SCH MG: 40 INJECTION, POWDER, FOR SOLUTION INTRAMUSCULAR; INTRAVENOUS at 06:07

## 2017-07-31 RX ADMIN — HYDROMORPHONE HYDROCHLORIDE PRN MG: 1 INJECTION, SOLUTION INTRAMUSCULAR; INTRAVENOUS; SUBCUTANEOUS at 06:06

## 2017-07-31 RX ADMIN — HYDROMORPHONE HYDROCHLORIDE PRN MG: 1 INJECTION, SOLUTION INTRAMUSCULAR; INTRAVENOUS; SUBCUTANEOUS at 13:46

## 2017-07-31 RX ADMIN — METHYLPREDNISOLONE SODIUM SUCCINATE SCH MG: 40 INJECTION, POWDER, FOR SOLUTION INTRAMUSCULAR; INTRAVENOUS at 13:46

## 2017-07-31 RX ADMIN — IPRATROPIUM BROMIDE AND ALBUTEROL SULFATE SCH ML: .5; 3 SOLUTION RESPIRATORY (INHALATION) at 20:22

## 2017-07-31 RX ADMIN — TIMOLOL MALEATE SCH DROPS: 5 SOLUTION OPHTHALMIC at 09:04

## 2017-07-31 RX ADMIN — ACETYLCYSTEINE SCH MG: 200 SOLUTION ORAL; RESPIRATORY (INHALATION) at 08:32

## 2017-07-31 RX ADMIN — GUAIFENESIN AND DEXTROMETHORPHAN HYDROBROMIDE SCH EACH: 600; 30 TABLET, EXTENDED RELEASE ORAL at 09:04

## 2017-07-31 RX ADMIN — METHYLPREDNISOLONE SODIUM SUCCINATE SCH MG: 40 INJECTION, POWDER, FOR SOLUTION INTRAMUSCULAR; INTRAVENOUS at 17:06

## 2017-07-31 RX ADMIN — DOCUSATE SODIUM SCH MG: 100 CAPSULE, LIQUID FILLED ORAL at 20:07

## 2017-07-31 RX ADMIN — LEVOFLOXACIN SCH MG: 750 TABLET, FILM COATED ORAL at 17:06

## 2017-07-31 RX ADMIN — METHYLPREDNISOLONE SODIUM SUCCINATE SCH MG: 40 INJECTION, POWDER, FOR SOLUTION INTRAMUSCULAR; INTRAVENOUS at 23:54

## 2017-07-31 RX ADMIN — LATANOPROST SCH DROPS: 50 SOLUTION OPHTHALMIC at 20:07

## 2017-07-31 RX ADMIN — POLYETHYLENE GLYCOL 3350 SCH GM: 17 POWDER, FOR SOLUTION ORAL at 09:04

## 2017-07-31 RX ADMIN — HYDROCODONE BITARTRATE AND ACETAMINOPHEN PRN EACH: 5; 325 TABLET ORAL at 15:58

## 2017-07-31 RX ADMIN — HYDROCODONE BITARTRATE AND ACETAMINOPHEN PRN EACH: 5; 325 TABLET ORAL at 04:46

## 2017-07-31 RX ADMIN — ACETYLCYSTEINE SCH MG: 200 SOLUTION ORAL; RESPIRATORY (INHALATION) at 20:22

## 2017-07-31 RX ADMIN — GUAIFENESIN AND DEXTROMETHORPHAN HYDROBROMIDE SCH: 600; 30 TABLET, EXTENDED RELEASE ORAL at 20:09

## 2017-07-31 RX ADMIN — HYDROMORPHONE HYDROCHLORIDE PRN MG: 1 INJECTION, SOLUTION INTRAMUSCULAR; INTRAVENOUS; SUBCUTANEOUS at 09:05

## 2017-07-31 RX ADMIN — GUAIFENESIN AND DEXTROMETHORPHAN HYDROBROMIDE SCH EACH: 600; 30 TABLET, EXTENDED RELEASE ORAL at 20:07

## 2017-07-31 RX ADMIN — HYDROMORPHONE HYDROCHLORIDE PRN MG: 1 INJECTION, SOLUTION INTRAMUSCULAR; INTRAVENOUS; SUBCUTANEOUS at 23:55

## 2017-07-31 RX ADMIN — DEXTROSE MONOHYDRATE SCH MLS/HR: 5 INJECTION, SOLUTION INTRAVENOUS at 23:54

## 2017-07-31 RX ADMIN — PANTOPRAZOLE SODIUM SCH MG: 40 TABLET, DELAYED RELEASE ORAL at 06:08

## 2017-07-31 RX ADMIN — HYDROMORPHONE HYDROCHLORIDE PRN MG: 1 INJECTION, SOLUTION INTRAMUSCULAR; INTRAVENOUS; SUBCUTANEOUS at 21:10

## 2017-07-31 RX ADMIN — IPRATROPIUM BROMIDE AND ALBUTEROL SULFATE SCH ML: .5; 3 SOLUTION RESPIRATORY (INHALATION) at 15:10

## 2017-07-31 RX ADMIN — HYDROCODONE BITARTRATE AND ACETAMINOPHEN PRN EACH: 5; 325 TABLET ORAL at 00:59

## 2017-07-31 RX ADMIN — DOCUSATE SODIUM SCH MG: 100 CAPSULE, LIQUID FILLED ORAL at 09:04

## 2017-07-31 RX ADMIN — IPRATROPIUM BROMIDE AND ALBUTEROL SULFATE SCH ML: .5; 3 SOLUTION RESPIRATORY (INHALATION) at 11:29

## 2017-07-31 RX ADMIN — HYDROMORPHONE HYDROCHLORIDE PRN MG: 1 INJECTION, SOLUTION INTRAMUSCULAR; INTRAVENOUS; SUBCUTANEOUS at 18:02

## 2017-07-31 RX ADMIN — DEXTROSE MONOHYDRATE SCH MLS/HR: 5 INJECTION, SOLUTION INTRAVENOUS at 09:04

## 2017-07-31 RX ADMIN — DEXTROSE MONOHYDRATE SCH MLS/HR: 5 INJECTION, SOLUTION INTRAVENOUS at 15:58

## 2017-07-31 RX ADMIN — HYDROCODONE BITARTRATE AND ACETAMINOPHEN PRN EACH: 5; 325 TABLET ORAL at 10:17

## 2017-07-31 RX ADMIN — HYDROMORPHONE HYDROCHLORIDE PRN MG: 1 INJECTION, SOLUTION INTRAMUSCULAR; INTRAVENOUS; SUBCUTANEOUS at 02:46

## 2017-07-31 RX ADMIN — HYDROCODONE BITARTRATE AND ACETAMINOPHEN PRN EACH: 5; 325 TABLET ORAL at 20:05

## 2017-07-31 RX ADMIN — IPRATROPIUM BROMIDE AND ALBUTEROL SULFATE SCH ML: .5; 3 SOLUTION RESPIRATORY (INHALATION) at 08:32

## 2017-07-31 RX ADMIN — HEPARIN SODIUM SCH UNIT: 5000 INJECTION, SOLUTION INTRAVENOUS; SUBCUTANEOUS at 09:04

## 2017-07-31 RX ADMIN — HYDROCODONE BITARTRATE AND ACETAMINOPHEN PRN EACH: 5; 325 TABLET ORAL at 23:55

## 2017-07-31 RX ADMIN — HEPARIN SODIUM SCH UNIT: 5000 INJECTION, SOLUTION INTRAVENOUS; SUBCUTANEOUS at 20:07

## 2017-07-31 NOTE — PN
DATE OF SERVICE:  07/30/2017



This 52-year-old woman who was admitted with left-sided pneumothorax is being 
closely monitored.  No chest pain or palpitation.  No fever. 

The patient is on broad-spectrum IV antibiotics also.  The possibility of 
pneumonia is being considered.  



On exam, alert and oriented x3. Pulse 79, blood pressure 117/82, respirations 18
, temperature 97.6, pulse ox 97% on 2 L. 

HEENT:  Conjunctivae normal. 

NECK:  No jugular venous distention. 

CARDIOVASCULAR:  S1 and S2 muffled. 

RESPIRATORY:  Breath sounds diminished at the bases.  A few scattered rhonchi 
and crackles. 

ABDOMEN:  Soft, nontender. 

LEGS:  No edema, no swelling. 

NERVOUS SYSTEM:  No focal deficits. 



LABS:  WBC 11.8 and .8. 



ASSESSMENT: 

1.  Fall and left-sided pneumothorax, status post Heimlich valve. 

2.  Possible bronchopneumonia. 

3.  Left-sided chest pain. 

4.  Multiple rib fractures on the left. 

5.  Chronic obstructive pulmonary disease. 

6.  Glaucoma. 

7.  Increased WBC. 



RECOMMENDATIONS AND DISCUSSION:  Recommend to continue current medications, 
continue with symptomatic treatment. Otherwise, closely follow with Dr. Rollins.
  Bronchodilators, Further recommendations to follow.  
PARRIS

## 2017-07-31 NOTE — PN
This patient is a 52-year-old female, status post traumatic left-sided 
pneumothorax. She had a Thoravent placed on the left side. Subsequent to that 
it was removed. The patient is doing reasonably well. She does have a history 
of pulmonary contusion and some left posterior rib fractures. More recently the 
patient has developed some nosocomial pneumonia. Doing okay. Feeling better 
today. She is on breathing treatments, incentive spirometer, and (     ) or 
Mucomyst was added to her breathing treatment. She does have a history of 
underlying COPD. She has a wet congested cough; cannot really bring up much 
phlegm. Currently her vital signs include temperature 98, heart rate 72, 
respiratory rate 16, blood pressure 121/72, mean 88. Two-liter saturation 94%. 
Appears in no acute distress.

HEENT examination is grossly unremarkable. Mucous membranes are moist. 

NECK: Supple. Full range of motion. No adenopathy or thyromegaly. Neck veins 
are flat.

Cardiovascular examination reveals regular rhythm and rate. S1 and S2 normal. 
No S3, S4 or murmur. 

Lungs reveal a few scattered coarse rhonchi. Breath sounds are diminished. She 
has a wet congested-sounding cough.

ABDOMEN: Soft. Bowel sounds are heard. No masses or tenderness.

Extremities are intact. No cyanosis, clubbing or edema.

SKIN: No rash. 

Neurologic examination is brief but non-focal.



No recent labs to review. 



Most recent chest x-ray is reviewed and compared to the x-ray done on July 29.



Microbiology is pending or negative.



Medications are reviewed.



ASSESSMENT:

1. Traumatic left-sided pneumothorax, status post Thoravent insertion and 
subsequent removal.

2. Small residual apical pneumothorax. 

3. Left-sided chest wall pain with pulmonary contusion and suspected left 
posterior rib fractures.

4. Chronic obstructive pulmonary disease secondary to chronic tobacco use. 

5. History of chronic tobacco use. 

6. Glaucoma. 

7. Possible nosocomial pneumonia.



PLAN: The patient seems to be doing better. She is on the appropriate 
medications, including antibiotics, bronchodilators, incentive spirometer, 
mucolytics, steroids. The patient may be discharged soon. No additional 
recommendations are made. I do not think she needs a bronchoscopy at this time. 
Will continue to follow. Prognosis is guarded. 
MTDD

## 2017-07-31 NOTE — XR
EXAMINATION TYPE: XR chest 1V portable

 

DATE OF EXAM: 7/31/2017

 

COMPARISON: Prior chest x-ray 7/29/2017

 

HISTORY: Pneumonia, status post chest tube removal

 

TECHNIQUE: Single frontal view of the chest is obtained.

 

FINDINGS:  There is been interval removal of the patient's thoracic stent. Small apical pneumothorax 
is present. Minimal patchy basilar density is present. Ill-defined area of increased density present 
in the right upper lobe not seen on prior exam.

 

IMPRESSION:  Minimal apical pneumothorax status post chest tube removal. There may be residual atelec
tasis or airspace disease. Abnormal density right upper lobe, follow-up is recommended.

## 2017-08-01 VITALS — RESPIRATION RATE: 16 BRPM

## 2017-08-01 RX ADMIN — GUAIFENESIN AND DEXTROMETHORPHAN HYDROBROMIDE SCH EACH: 600; 30 TABLET, EXTENDED RELEASE ORAL at 10:19

## 2017-08-01 RX ADMIN — IPRATROPIUM BROMIDE AND ALBUTEROL SULFATE SCH ML: .5; 3 SOLUTION RESPIRATORY (INHALATION) at 08:39

## 2017-08-01 RX ADMIN — HYDROCODONE BITARTRATE AND ACETAMINOPHEN PRN EACH: 5; 325 TABLET ORAL at 06:57

## 2017-08-01 RX ADMIN — TIMOLOL MALEATE SCH DROPS: 5 SOLUTION OPHTHALMIC at 10:19

## 2017-08-01 RX ADMIN — HYDROCODONE BITARTRATE AND ACETAMINOPHEN PRN EACH: 5; 325 TABLET ORAL at 23:48

## 2017-08-01 RX ADMIN — DOCUSATE SODIUM SCH MG: 100 CAPSULE, LIQUID FILLED ORAL at 22:25

## 2017-08-01 RX ADMIN — ACETYLCYSTEINE SCH MG: 200 SOLUTION ORAL; RESPIRATORY (INHALATION) at 11:54

## 2017-08-01 RX ADMIN — METHYLPREDNISOLONE SODIUM SUCCINATE SCH MG: 40 INJECTION, POWDER, FOR SOLUTION INTRAMUSCULAR; INTRAVENOUS at 11:08

## 2017-08-01 RX ADMIN — DOCUSATE SODIUM SCH MG: 100 CAPSULE, LIQUID FILLED ORAL at 10:19

## 2017-08-01 RX ADMIN — ACETYLCYSTEINE SCH MG: 200 SOLUTION ORAL; RESPIRATORY (INHALATION) at 21:14

## 2017-08-01 RX ADMIN — DEXTROSE MONOHYDRATE SCH MLS/HR: 5 INJECTION, SOLUTION INTRAVENOUS at 16:54

## 2017-08-01 RX ADMIN — DEXTROSE MONOHYDRATE SCH MLS/HR: 5 INJECTION, SOLUTION INTRAVENOUS at 10:18

## 2017-08-01 RX ADMIN — ACETYLCYSTEINE SCH MG: 200 SOLUTION ORAL; RESPIRATORY (INHALATION) at 08:38

## 2017-08-01 RX ADMIN — HYDROCODONE BITARTRATE AND ACETAMINOPHEN PRN EACH: 5; 325 TABLET ORAL at 18:48

## 2017-08-01 RX ADMIN — HEPARIN SODIUM SCH UNIT: 5000 INJECTION, SOLUTION INTRAVENOUS; SUBCUTANEOUS at 21:47

## 2017-08-01 RX ADMIN — METHYLPREDNISOLONE SODIUM SUCCINATE SCH MG: 40 INJECTION, POWDER, FOR SOLUTION INTRAMUSCULAR; INTRAVENOUS at 06:56

## 2017-08-01 RX ADMIN — IPRATROPIUM BROMIDE AND ALBUTEROL SULFATE SCH ML: .5; 3 SOLUTION RESPIRATORY (INHALATION) at 11:54

## 2017-08-01 RX ADMIN — HYDROCODONE BITARTRATE AND ACETAMINOPHEN PRN EACH: 5; 325 TABLET ORAL at 11:07

## 2017-08-01 RX ADMIN — IPRATROPIUM BROMIDE AND ALBUTEROL SULFATE SCH ML: .5; 3 SOLUTION RESPIRATORY (INHALATION) at 15:30

## 2017-08-01 RX ADMIN — HYDROCODONE BITARTRATE AND ACETAMINOPHEN PRN EACH: 5; 325 TABLET ORAL at 03:13

## 2017-08-01 RX ADMIN — LATANOPROST SCH DROPS: 50 SOLUTION OPHTHALMIC at 22:25

## 2017-08-01 RX ADMIN — METHYLPREDNISOLONE SODIUM SUCCINATE SCH MG: 40 INJECTION, POWDER, FOR SOLUTION INTRAMUSCULAR; INTRAVENOUS at 16:55

## 2017-08-01 RX ADMIN — DEXTROSE MONOHYDRATE SCH MLS/HR: 5 INJECTION, SOLUTION INTRAVENOUS at 23:47

## 2017-08-01 RX ADMIN — IPRATROPIUM BROMIDE AND ALBUTEROL SULFATE SCH ML: .5; 3 SOLUTION RESPIRATORY (INHALATION) at 21:14

## 2017-08-01 RX ADMIN — GUAIFENESIN AND DEXTROMETHORPHAN HYDROBROMIDE SCH EACH: 600; 30 TABLET, EXTENDED RELEASE ORAL at 22:25

## 2017-08-01 RX ADMIN — POLYETHYLENE GLYCOL 3350 SCH GM: 17 POWDER, FOR SOLUTION ORAL at 10:18

## 2017-08-01 RX ADMIN — PANTOPRAZOLE SODIUM SCH MG: 40 TABLET, DELAYED RELEASE ORAL at 06:56

## 2017-08-01 RX ADMIN — BISACODYL SCH MG: 10 SUPPOSITORY RECTAL at 10:18

## 2017-08-01 RX ADMIN — HEPARIN SODIUM SCH UNIT: 5000 INJECTION, SOLUTION INTRAVENOUS; SUBCUTANEOUS at 10:18

## 2017-08-01 RX ADMIN — LEVOFLOXACIN SCH MG: 750 TABLET, FILM COATED ORAL at 16:55

## 2017-08-01 RX ADMIN — HYDROCODONE BITARTRATE AND ACETAMINOPHEN PRN EACH: 5; 325 TABLET ORAL at 15:05

## 2017-08-01 RX ADMIN — METHYLPREDNISOLONE SODIUM SUCCINATE SCH MG: 40 INJECTION, POWDER, FOR SOLUTION INTRAMUSCULAR; INTRAVENOUS at 23:48

## 2017-08-01 RX ADMIN — NICOTINE SCH PATCH: 21 PATCH, EXTENDED RELEASE TRANSDERMAL at 16:54

## 2017-08-01 NOTE — PN
DATE OF SERVICE:  07/31/2017



This 52-year-old woman who was admitted after left sided pneumothorax also had 
bronchopneumonia in the outpatient complaining of severe pain. No chest pain or 
palpitation. No fever.  Most recent chest x-ray is noted.  



On exam, alert and oriented x3. Pulse is 70, blood pressure 125/80, 
respirations 16, temperature normal, pulse ox 92% on room air. 

HEENT:  Conjunctivae normal. 

NECK:  No jugular venous distention. 

CARDIOVASCULAR:  S1 and S2, muffled. 

RESPIRATORY:  Breath sounds diminished at the bases. A few scattered rhonchi 
and crackles bilaterally.  

ABDOMEN:  Soft, nontender.  No mass palpable. 

LEGS:  No edema. 

NERVOUS SYSTEM:  No focal deficits. 



LABS:  WBC 11.8. Glucose 119. 



ASSESSMENT: 

1.  Fall and left-sided pneumothorax, status post Heimlich valve. 

2.  Possible bronchopneumonia on the left side. 

3.  Left-sided chest pain. 

4.  Multiple rib fractures on the left. 

5.  Chronic obstructive pulmonary disease. 

6.  Glaucoma. 

7.  Increased WBC. 



RECOMMENDATIONS AND DISCUSSION:  Recommend to continue current medications. 
Continue monitoring and symptomatic treatment. Continue with bronchodilators.  
Continue with antibiotics.  Otherwise continue to monitor. Continue with pain 
medications. Further recommendations to follow.  
MTDD

## 2017-08-01 NOTE — PN
52-year-old female with traumatic left sided pneumothorax and left sided 
posterior rib fractures. She had a Thoravent placed and then it was 
subsequently removed.  She still had a very small apical pneumothorax on the 
left.  She has improved clinically.  She is about 90% better.  She could be 
discharged home today.  I told her that she needs pain medication to help her 
take deep breaths, cough, and clear secretions.  She should go home on the 
incentive spirometry.  She will need a short course of antibiotics as well.   
Narcotics are causing constipation and bloating. 

Current vital signs are stable.  Temperature 97.   Heart rate 80.  Respiratory 
rate 16.  Blood pressure 133/91.  Mean 105.  2 L saturation 97%.   Needs room 
air saturation to be checked.  Appears in no acute distress.   Clinically looks 
well.  



HEENT examination is grossly unremarkable.  Mucous  membranes are moist.   Neck 
is supple.   Cardiovascular examination reveals a regular rhythm and rate, S1, 
S2 normal.   There is no S3, S4 or murmur.  Lungs are relatively clear.   A few 
scattered mild rhonchi.  No wheezes.      No crackles. Abdomen is soft. Bowel 
sounds are heard.  Extremities are intact.  No cyanosis, clubbing or edema.    
Skin without rash.    Brief neurological examination is nonfocal.  I looked at 
the Thoravent site yesterday in the left anterior chest area and it looked 
fine.  

Labs are reviewed.  Nothing new to report.    No new x-rays to report.  





Medications reviewed.  



ASSESSMENT: 

1.   Traumatic left sided pneumothorax, status post Thoravent insertion and 
subsequent removal.

2.   Small residual apical pneumothorax.

3.   Left sided chest wall pain with pulmonary contusion and left posterior rib 
fractures. 

4.   Chronic obstructive pulmonary disease secondary to chronic tobacco use. 

5.   History of chronic tobacco use.

6.   History of glaucoma. 

7.   Possible nosocomial pneumonia. 



PLAN:  The patient is doing well.  Could be discharged home today. No 
additional recommendations are made. Possible discharge today or tomorrow.  We 
will continue to follow.  Prognosis is guarded.  She is to go home on a small 
amount of oral narcotics and incentive spirometer.    She could be discharged 
home on some antibiotics for a short period of time.  She will need follow-up x-
ray in our office.  

PARRIS

## 2017-08-02 VITALS — HEART RATE: 74 BPM

## 2017-08-02 VITALS — DIASTOLIC BLOOD PRESSURE: 76 MMHG | SYSTOLIC BLOOD PRESSURE: 128 MMHG | TEMPERATURE: 97.9 F

## 2017-08-02 RX ADMIN — METHYLPREDNISOLONE SODIUM SUCCINATE SCH MG: 40 INJECTION, POWDER, FOR SOLUTION INTRAMUSCULAR; INTRAVENOUS at 12:56

## 2017-08-02 RX ADMIN — METHYLPREDNISOLONE SODIUM SUCCINATE SCH MG: 40 INJECTION, POWDER, FOR SOLUTION INTRAMUSCULAR; INTRAVENOUS at 06:24

## 2017-08-02 RX ADMIN — HYDROCODONE BITARTRATE AND ACETAMINOPHEN PRN EACH: 5; 325 TABLET ORAL at 12:55

## 2017-08-02 RX ADMIN — POLYETHYLENE GLYCOL 3350 SCH GM: 17 POWDER, FOR SOLUTION ORAL at 08:37

## 2017-08-02 RX ADMIN — HYDROCODONE BITARTRATE AND ACETAMINOPHEN PRN EACH: 5; 325 TABLET ORAL at 08:49

## 2017-08-02 RX ADMIN — HEPARIN SODIUM SCH UNIT: 5000 INJECTION, SOLUTION INTRAVENOUS; SUBCUTANEOUS at 08:37

## 2017-08-02 RX ADMIN — HYDROCODONE BITARTRATE AND ACETAMINOPHEN PRN EACH: 5; 325 TABLET ORAL at 03:01

## 2017-08-02 RX ADMIN — BISACODYL SCH MG: 10 SUPPOSITORY RECTAL at 08:49

## 2017-08-02 RX ADMIN — DOCUSATE SODIUM SCH MG: 100 CAPSULE, LIQUID FILLED ORAL at 08:36

## 2017-08-02 RX ADMIN — ACETYLCYSTEINE SCH MG: 200 SOLUTION ORAL; RESPIRATORY (INHALATION) at 08:08

## 2017-08-02 RX ADMIN — ACETYLCYSTEINE SCH MG: 200 SOLUTION ORAL; RESPIRATORY (INHALATION) at 11:25

## 2017-08-02 RX ADMIN — IPRATROPIUM BROMIDE AND ALBUTEROL SULFATE SCH ML: .5; 3 SOLUTION RESPIRATORY (INHALATION) at 08:08

## 2017-08-02 RX ADMIN — IPRATROPIUM BROMIDE AND ALBUTEROL SULFATE SCH ML: .5; 3 SOLUTION RESPIRATORY (INHALATION) at 11:25

## 2017-08-02 RX ADMIN — PANTOPRAZOLE SODIUM SCH MG: 40 TABLET, DELAYED RELEASE ORAL at 08:35

## 2017-08-02 RX ADMIN — DEXTROSE MONOHYDRATE SCH MLS/HR: 5 INJECTION, SOLUTION INTRAVENOUS at 09:16

## 2017-08-02 RX ADMIN — HYDROMORPHONE HYDROCHLORIDE PRN MG: 1 INJECTION, SOLUTION INTRAMUSCULAR; INTRAVENOUS; SUBCUTANEOUS at 05:42

## 2017-08-02 RX ADMIN — HYDROMORPHONE HYDROCHLORIDE PRN MG: 1 INJECTION, SOLUTION INTRAMUSCULAR; INTRAVENOUS; SUBCUTANEOUS at 01:10

## 2017-08-02 RX ADMIN — GUAIFENESIN AND DEXTROMETHORPHAN HYDROBROMIDE SCH EACH: 600; 30 TABLET, EXTENDED RELEASE ORAL at 08:36

## 2017-08-02 RX ADMIN — TIMOLOL MALEATE SCH DROPS: 5 SOLUTION OPHTHALMIC at 08:38

## 2017-08-02 NOTE — PN
DATE OF SERVICE:  08/01/17



This 52 -year-old woman who was admitted with fall and left sided pneumonia, 
also had Heimlich valve.  The patient improving significantly.  No chest pain. 
No palpitations. No fever.   



PHYSICAL EXAMINATION:  The patient is alert and oriented times three.  Pulse 
86.  Blood pressure 120/74.   Respiratory rate 12.  Temperature 96.7.  Pulse ox 
93% on room air. 

HEENT: Conjunctivae normal. 

NECK: No JVD.

CARDIOVASCULAR: S1, S2 muffled. 

RESPIRATORY: Breath sounds diminished at the bases.  A few scattered rhonchi 
and crackles.  Abdomen is soft, nontender.  LEGS: No edema. No swelling. 

NERVOUS SYSTEM: No focal deficits.

 

LABS:  WBC 11.8, hemoglobin 12.9.   



ASSESSMENT: 

1.   Fall and left sided pneumothorax, status post Heimlich valve.  

2.   Possible bronchopneumonia on the left side. 

3.   Left sided chest pain.

4.   Multiple fractures in the left ribs. 

5.   Chronic obstructive pulmonary disease.

6.   Glaucoma. 

7.   Increased WBC.



RECOMMENDATIONS AND DISCUSSION:   Recommend to continue current medications.  
Continue with monitoring.  Symptomatic treatment.   Guarded prognosis because 
of multiple medical issues.  Further recommendations to follow. 
MTDD

## 2017-08-02 NOTE — P.PN
Subjective





Progress note dated 8-2-17





This is a 52-year-old female with history of traumatic left-sided pneumothorax, 

status post Thoravent insertion and subsequent removal.  The patient is doing 

relatively well.  Her left-sided chest pain primarily relates a pulmonary 

contusion and posterior rib fractures.  She also has a history of chronic 

tobacco use and possible underlying COPD as well as history of glaucoma and 

nosocomial pneumonia.  Patient seemed be doing relatively well.  From my 

perspective could be discharged.





Objective





- Vital Signs


Vital signs: 


 Vital Signs











Temp  97.9 F   08/02/17 07:00


 


Pulse  80   08/02/17 08:22


 


Resp  16   08/02/17 07:00


 


BP  128/76   08/02/17 07:00


 


Pulse Ox  91 L  08/02/17 07:00








 Intake & Output











 08/01/17 08/02/17 08/02/17





 18:59 06:59 18:59


 


Intake Total 100 1020 


 


Balance 100 1020 


 


Weight  66.7 kg 


 


Intake:   


 


  Intake, IV Titration 100 50 





  Amount   


 


    Piperacillin-Tazobactam 3 100 50 





    .375 gm In Dextrose/Water   





    1 50ml.bag @ 12.5 mls/hr   





    IVPB Q8HR Replaced by [facility] Rx#:   





    [id number]   


 


  Oral  970 


 


Other:   


 


  Voiding Method Toilet Toilet 


 


  # Voids 3 1 


 


  # Bowel Movements 1  














- Exam





No acute distress, oriented 3.





HEENT examination is grossly unremarkable.  Mucous membranes are moist.  No 

oral lesions.





Neck supple.  Full range of motion.  No adenopathy or thyromegaly.





Cardio vascular examination reveals regular rhythm rate.  S1-S2 normal.  No S3-

S4.  No distinct murmur noted.





Lungs reveal a few scattered rhonchi.  No wheezes or crackles.  Breath sounds 

equal.





Abdomen soft bowel sounds are heard.





Extremities are intact.  No cyanosis clubbing or edema.





Skin without rash.





Neurologic examination is prepared nonfocal.





- Labs


CBC & Chem 7: 


 07/30/17 05:52





 07/30/17 05:52





Assessment and Plan


(1) Rib fractures


Status: Acute   





(2) COPD (chronic obstructive pulmonary disease)


Status: Acute   





(3) Acute pneumothorax


Status: Acute   





(4) Contusion of rib on left side


Status: Acute   





(5) Fall


Status: Acute   


Plan: 





Plan dated 8-2-17





The patient could be discharged home.  She is doing well.  She needs pain 

medication.  She'll also need a short course of antibiotics and an incentive 

spirometer.  We counseled her about the importance of smoking cessation.  She'

ll be seen in the office on Friday by Dr. Bolden.  At that time she'll have a 

follow-up chest x-ray.


Time with Patient: Less than 30

## 2017-08-03 ENCOUNTER — HOSPITAL ENCOUNTER (OUTPATIENT)
Dept: HOSPITAL 47 - LABWHC1 | Age: 52
Discharge: HOME | End: 2017-08-03
Attending: HOSPITALIST
Payer: MEDICAID

## 2017-08-03 DIAGNOSIS — D72.829: Primary | ICD-10-CM

## 2017-08-03 LAB
ANION GAP SERPL CALC-SCNC: 6 MMOL/L
BASOPHILS # BLD AUTO: 0.1 K/UL (ref 0–0.2)
BASOPHILS NFR BLD AUTO: 1 %
BUN SERPL-SCNC: 23 MG/DL (ref 7–17)
CALCIUM SPEC-MCNC: 9.1 MG/DL (ref 8.4–10.2)
CH: 32.5
CHCM: 32.2
CHLORIDE SERPL-SCNC: 100 MMOL/L (ref 98–107)
CO2 SERPL-SCNC: 31 MMOL/L (ref 22–30)
EOSINOPHIL # BLD AUTO: 0.2 K/UL (ref 0–0.7)
EOSINOPHIL NFR BLD AUTO: 2 %
ERYTHROCYTE [DISTWIDTH] IN BLOOD BY AUTOMATED COUNT: 4.43 M/UL (ref 3.8–5.4)
ERYTHROCYTE [DISTWIDTH] IN BLOOD: 12.9 % (ref 11.5–15.5)
GLUCOSE SERPL-MCNC: 87 MG/DL (ref 74–99)
HCT VFR BLD AUTO: 44.9 % (ref 34–46)
HDW: 2.11
HGB BLD-MCNC: 14.7 GM/DL (ref 11.4–16)
LUC NFR BLD AUTO: 1 %
LYMPHOCYTES # SPEC AUTO: 2.6 K/UL (ref 1–4.8)
LYMPHOCYTES NFR SPEC AUTO: 26 %
MCH RBC QN AUTO: 33.2 PG (ref 25–35)
MCHC RBC AUTO-ENTMCNC: 32.7 G/DL (ref 31–37)
MCV RBC AUTO: 101.5 FL (ref 80–100)
MONOCYTES # BLD AUTO: 0.7 K/UL (ref 0–1)
MONOCYTES NFR BLD AUTO: 7 %
NEUTROPHILS # BLD AUTO: 6.3 K/UL (ref 1.3–7.7)
NEUTROPHILS NFR BLD AUTO: 63 %
NON-AFRICAN AMERICAN GFR(MDRD): >60
POTASSIUM SERPL-SCNC: 4.5 MMOL/L (ref 3.5–5.1)
SODIUM SERPL-SCNC: 137 MMOL/L (ref 137–145)
WBC # BLD AUTO: 0.14 10*3/UL
WBC # BLD AUTO: 10 K/UL (ref 3.8–10.6)
WBC (PEROX): 10.12

## 2017-08-03 PROCEDURE — 85025 COMPLETE CBC W/AUTO DIFF WBC: CPT

## 2017-08-03 PROCEDURE — 80048 BASIC METABOLIC PNL TOTAL CA: CPT

## 2017-08-03 PROCEDURE — 36415 COLL VENOUS BLD VENIPUNCTURE: CPT

## 2018-01-05 ENCOUNTER — HOSPITAL ENCOUNTER (OUTPATIENT)
Dept: HOSPITAL 47 - RADMAMWWP | Age: 53
Discharge: HOME | End: 2018-01-05
Payer: MEDICAID

## 2018-01-05 DIAGNOSIS — Z12.31: Primary | ICD-10-CM

## 2018-01-05 PROCEDURE — 77067 SCR MAMMO BI INCL CAD: CPT

## 2018-01-05 PROCEDURE — 77063 BREAST TOMOSYNTHESIS BI: CPT

## 2018-01-09 NOTE — MM
Reason for exam: screening  (asymptomatic).

Last mammogram was performed 1 year and 4 months ago.



History:

Retro-pectoral saline implants in both breasts, January 1997.



Physical Findings:

Nurse did not find any significant physical abnormalities on exam.



MG 3D Screen Mammo Imp/Cad

Bilateral CC, MLO, and ID view(s) were taken.

Prior study comparison: September 2, 2016, bilateral MG 3d screen mammo imp/cad.  

January 15, 2009, bilateral diagnostic digital mammog.

The breast tissue is extremely dense which could obscure a lesion on mammography. 

No significant changes when compared with prior studies.





ASSESSMENT: Benign, BI-RAD 2



RECOMMENDATION:

Routine screening mammogram of both breasts in 1 year.

## 2019-03-15 ENCOUNTER — HOSPITAL ENCOUNTER (OUTPATIENT)
Dept: HOSPITAL 47 - RADMAMWWP | Age: 54
Discharge: HOME | End: 2019-03-15
Attending: INTERNAL MEDICINE
Payer: MEDICAID

## 2019-03-15 DIAGNOSIS — Z12.31: Primary | ICD-10-CM

## 2019-03-15 DIAGNOSIS — Z98.82: ICD-10-CM

## 2019-03-15 PROCEDURE — 77067 SCR MAMMO BI INCL CAD: CPT

## 2019-03-15 PROCEDURE — 77063 BREAST TOMOSYNTHESIS BI: CPT

## 2019-03-18 NOTE — MM
Reason for exam: screening  (asymptomatic).

Last mammogram was performed 1 year and 2 months ago.



History:

Patient is postmenopausal.

Retro-pectoral saline implants in both breasts, January 1997.



Physical Findings:

A clinical breast exam by your physician is recommended on an annual basis and 

results should be correlated with mammographic findings.



MG 3D Screen Mammo Imp/Cad

Bilateral CC, MLO, and ID view(s) were taken.

Prior study comparison: January 5, 2018, bilateral MG 3d screen mammo imp/cad.  

September 2, 2016, bilateral MG 3d screen mammo imp/cad.

The breast tissue is extremely dense which could obscure a lesion on mammography. 

Bilateral retropectoral saline implants. Left lateral anterior depth asymmetry.





ASSESSMENT: Incomplete: need additional imaging evaluation, BI-RAD 0



RECOMMENDATION:

Special view mammogram of the left breast.



If lesion persists on supplemental views, image directed ultrasound is 

recommended.



[facility] will attempt to contact patient to return for supplemental 

views and ultrasound if indicated.

## 2019-03-26 ENCOUNTER — HOSPITAL ENCOUNTER (OUTPATIENT)
Dept: HOSPITAL 47 - RADMAMWWP | Age: 54
Discharge: HOME | End: 2019-03-26
Attending: INTERNAL MEDICINE
Payer: MEDICAID

## 2019-03-26 DIAGNOSIS — R92.8: Primary | ICD-10-CM

## 2019-03-26 PROCEDURE — 77061 BREAST TOMOSYNTHESIS UNI: CPT

## 2019-03-26 PROCEDURE — 77065 DX MAMMO INCL CAD UNI: CPT

## 2019-03-26 NOTE — USB
Reason for exam: additional evaluation requested from abnormal screening.



History:

Patient is postmenopausal.

Retro-pectoral saline implants in both breasts, January 1997.

Took hormonal contraceptives beginning at age 16.



US Breast Workup Limited LT

Left limited breast ultrasound including focal area of concern, retroareolar and 

axilla demonstrates no cystic or solid lesion seen. Dense tissue corresponds to 

the mammographic abnormality.



These results were verbally communicated with the patient and result sheet given 

to the patient on 3/26/19.





ASSESSMENT: Negative, BI-RAD 1



RECOMMENDATION:

Return to routine screening mammogram schedule for both breasts.

## 2020-02-20 ENCOUNTER — HOSPITAL ENCOUNTER (OUTPATIENT)
Dept: HOSPITAL 47 - ORWHC2ENDO | Age: 55
End: 2020-02-20
Attending: INTERNAL MEDICINE
Payer: MEDICAID

## 2020-02-20 VITALS — HEART RATE: 101 BPM | DIASTOLIC BLOOD PRESSURE: 76 MMHG | SYSTOLIC BLOOD PRESSURE: 133 MMHG

## 2020-02-20 VITALS — TEMPERATURE: 97.6 F

## 2020-02-20 VITALS — BODY MASS INDEX: 23.7 KG/M2

## 2020-02-20 VITALS — RESPIRATION RATE: 20 BRPM

## 2020-02-20 DIAGNOSIS — H40.9: ICD-10-CM

## 2020-02-20 DIAGNOSIS — J44.9: Primary | ICD-10-CM

## 2020-02-20 DIAGNOSIS — Z83.79: ICD-10-CM

## 2020-02-20 DIAGNOSIS — Z79.891: ICD-10-CM

## 2020-02-20 DIAGNOSIS — Z87.09: ICD-10-CM

## 2020-02-20 DIAGNOSIS — Z79.2: ICD-10-CM

## 2020-02-20 DIAGNOSIS — F17.200: ICD-10-CM

## 2020-02-20 DIAGNOSIS — Z82.49: ICD-10-CM

## 2020-02-20 DIAGNOSIS — Z79.1: ICD-10-CM

## 2020-02-20 DIAGNOSIS — Z82.5: ICD-10-CM

## 2020-02-20 DIAGNOSIS — Z88.1: ICD-10-CM

## 2020-02-20 DIAGNOSIS — Z87.81: ICD-10-CM

## 2020-02-20 DIAGNOSIS — Z79.899: ICD-10-CM

## 2020-02-20 DIAGNOSIS — Z98.82: ICD-10-CM

## 2020-02-20 LAB
CELL CNT PNL FLD: 100
DEPRECATED POLYS # FLD: 98 %
MONONUC CELLS # FLD: 2 %
NUC CELL # FLD: 210 /UL

## 2020-02-20 PROCEDURE — 31624 DX BRONCHOSCOPE/LAVAGE: CPT

## 2020-02-20 PROCEDURE — 87206 SMEAR FLUORESCENT/ACID STAI: CPT

## 2020-02-20 PROCEDURE — 88305 TISSUE EXAM BY PATHOLOGIST: CPT

## 2020-02-20 PROCEDURE — 87498 ENTEROVIRUS PROBE&REVRS TRNS: CPT

## 2020-02-20 PROCEDURE — 87798 DETECT AGENT NOS DNA AMP: CPT

## 2020-02-20 PROCEDURE — 87634 RSV DNA/RNA AMP PROBE: CPT

## 2020-02-20 PROCEDURE — 87116 MYCOBACTERIA CULTURE: CPT

## 2020-02-20 PROCEDURE — 94667 MNPJ CHEST WALL 1ST: CPT

## 2020-02-20 PROCEDURE — 87102 FUNGUS ISOLATION CULTURE: CPT

## 2020-02-20 PROCEDURE — 87529 HSV DNA AMP PROBE: CPT

## 2020-02-20 PROCEDURE — 87496 CYTOMEG DNA AMP PROBE: CPT

## 2020-02-20 PROCEDURE — 89050 BODY FLUID CELL COUNT: CPT

## 2020-02-20 PROCEDURE — 87252 VIRUS INOCULATION TISSUE: CPT

## 2020-02-20 PROCEDURE — 87205 SMEAR GRAM STAIN: CPT

## 2020-02-20 PROCEDURE — 88108 CYTOPATH CONCENTRATE TECH: CPT

## 2020-02-20 PROCEDURE — 87502 INFLUENZA DNA AMP PROBE: CPT

## 2020-02-20 PROCEDURE — 87070 CULTURE OTHR SPECIMN AEROBIC: CPT

## 2020-02-21 NOTE — PCN
PROCEDURE NOTE



PROCEDURE:

Bronchoscopy, airway examination, therapeutic lavage, BAL.



PREOPERATIVE DIAGNOSIS:

Chronic bronchitis and retained secretions.



POSTOPERATIVE DIAGNOSIS:

Chronic bronchitis and retained secretions.



There was informed consent and universal timeout.



[facility] provided general anesthesia.  



The patient's procedure was done in room #1.



:

Dr. Stapleton.



DESCRIPTION OF PROCEDURE:

After the patient was adequately sedated and being fully monitored, the 
bronchoscope

was inserted through the right nostril.  It passed through the right nasopharynx
into

the oropharynx. Passed then into the hypopharynx.  The hypopharyngeal structures

including anterior commissure, true cords, false cords, arytenoids, piriform 
sinuses,

valleculae and epiglottis all appeared normal.  The glottic structures

were topicalized. The bronchoscope was pushed through the glottic opening into 
the

trachea. The trachea appeared relatively normal although there were secretions 
noted

within the trachea. They were suctioned.  They were purulent looking.  Tracheal 
nj

was sharp.  The right and left mainstem were topicalized.  The right upper lobe 
and its

3 segments, right middle lobe and its 2 segments right lower lobe and its 5 
segments,

the left upper lobe and its 2 segments, the lingula and its 2 segments and the 
left

lower lobe and its 4 segments all have similar findings of diffuse airway 
erythema and

hyperemia.  There was moderate bronchitis throughout.  Of note, there was no 
dominant

mass or tumor.  The mucosa was friable and bled easily.  There was vascular

engorgement.  There were thick secretions noted throughout.  They were 
suctioned.  The

bronchoscope was then wedged into the right middle lobe, 30 mL of cloudy fluid 
was

returned.  Next, additional saline was used to cleanse the rest of the airways. 
Once

done, the bronchoscope was withdrawn.  There was no immediate complication.  The
fluid

will be sent for analysis.  The patient tolerated the procedure well and will be

recovered.





MMODL / IJN: [id number] / Job#: [id number]

PARRIS

## 2020-09-16 ENCOUNTER — HOSPITAL ENCOUNTER (OUTPATIENT)
Dept: HOSPITAL 47 - RADCTMAIN | Age: 55
Discharge: HOME | End: 2020-09-16
Attending: INTERNAL MEDICINE
Payer: MEDICAID

## 2020-09-16 DIAGNOSIS — Z12.2: Primary | ICD-10-CM

## 2020-09-16 DIAGNOSIS — F17.210: ICD-10-CM

## 2020-09-17 NOTE — CTL
EXAMINATION TYPE:  CT Low Dose Lung

 

DATE OF EXAM ORDERED: 9/16/2020

 

HISTORY: Personal tobacco use. Lung cancer screening

 

CT DLP: 104.9 mGycm

CT CTDI: 2.9 mGy

Automated exposure control for dose reduction was used.

 

SCREENING VISIT: Initial

 

COMPARISON: 7/28/2017

 

TECHNIQUE: Low dose computed tomography scan was performed through the chest at 1 mm thick sections a
nd reconstructed images in the coronal plane at 1 mm thick sections.

 

CT DIAGNOSTIC QUALITY: Satisfactory

 

FINDINGS:

LUNG NODULES: Present, detailed below:

 

There is a 0.4 cm peripheral right apical nodule. Series 4 image 44.

There is a 0.4 cm calcified nodule within the periphery of the right upper lung field. Series 4 image
 48.

There are 0.2 cm nodules in the periphery of the right upper lobe. Images 55-56.

There is a 0.4 cm peripheral left medial calcified nodule. Series 4 image 66.

There is some pleural thickening along the lateral right upper lung field measuring 0.8 cm approximat
ely 0.3 cm in depth. Series 4 image 72.

 

Couple of punctate peripheral densities are in the anterior right lung, series 4 image 99.

 

Multiple calcified granuloma are present. This would include series 4 image 71 2 left lung nodules, s
eries 4 image 66 left lung, series 4 image 48 right upper lobe, series 4 image 74 medial left lung, a
nd medial right lung, 2 nodules left midlung series 4 image 80, medial right midlung series 4 image 8
9, series 4 image 95 mediastinal border and anterior border. Couple of peripheral calcifications in t
he anterior right middle lobea series 4 image 103. A posterior right lung nodules also present. Poste
rior nodules are in the left lung and right lung series 4 image 124 couple of calcifications are with
in the right middle lobe series 4 image 135. Peripheral calcifications posterior right lung same leve
l. 2 punctate lingular peripheral calcifications are present series 4 image 135. Posterior left lung 
base nodules are present posterior lateral left right lung nodule series 4 image 142.

 

Additional extensive peripheral calcifications extend through the lung bases.

 

LUNGS:

COPD: Severity: None

Fibrosis: Severity:

Lymph nodes: No enlarged lymphadenopathy

Other findings: None

 

RIGHT PLEURAL SPACE:

Effusion: None

Calcification: None

Thickening: None

Pneumothorax: None

 

LEFT PLEURAL SPACE:

Effusion: None

Calcification: None

Thickening: None

Pneumothorax: None

 

HEART:  

Heart Size: Normal

Coronary calcification: Mild

Pericardial effusion: None

 

OTHER FINDINGS:

Upper abdomen: Normal

Bony thorax: Normal

Supraclavicular region: Normal

Other: Ascending thoracic aorta at the level the main pulmonary artery measures 3.5 cm.  The main pul
monary artery at the bifurcation measures 2.4 cm.

 

IMPRESSION: Probably benign findings. Suspected multiple calcified tiny granuloma.

 

FOLLOW UP CT CHEST RECOMMENDATION: Yes, standard CT chest 6 months

CT LUNG RAD: 3

## 2022-01-11 NOTE — P.HPOR
History of Present Illness


H&P Date: 01/11/22


Chief Complaint: Left distal radius fracture





Subjective:


This is a 56 year old female that presents today for initial evaluation 

regarding a left wrist injury that occurred on 1/9/22. She states she slipped 

outside on ice at her home and landed onto an outstretched hand. She figured she

sprained her wrist but sought additional treatment today due to the amount of 

bruising, swelling and pain. 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 She 

currently is in a removable wrist splint. She denies any paresthesias and denies

any other areas of pain and denies any prior injury to this wrist in the past. 





Physical Examination:





LUE: AIN/PIN/Radial/Ulnar/Median motor intact. Radial/Ulnar/Median SILT. 2+/4 

Radial/Ulnar pulses palpated. 5/5 APB, 5/5 FDI. Wrist ROM limited due to 

pain/swelling. 








Imaging:


X-Rays of the left wrist demonstrate a displaced intra-articular distal radius 

fracture with 40 degrees of dorsal angulation with loss of radial height.





Impression:





1.) Left intra-articular distal radius fracture, displaced. 








Plan:





Diagnosis and treatment options were discussed with the patient. Due to the 

amount of displacement and angulation seen on today's imaging I recommend 

surgical intervention with ORIF. Risks and benefit of surgery including 

bleeding, infection, damage to surrounding tissue, need for further surgery, 

hardware irritation, possible need for removal of hardware were discussed and 

the patient wished to go forward with surgery. Rings were removed in office 

today and she is advised to rest, ice and elevate the left upper extremity. She 

was placed back into her removable left wrist splint. She will be in a post 

operative splint for 10-14 days after surgery and we will then transition her to

a removable wrist splint after. She may use the the left hand in the splint for 

writing but nothing heavier than a pen or pencil for the first 2 weeks after 

surgery. She wishes to get back to work as soon as possible, as long as there is

no lifting with the left hand she may return to work as soon as desired with the

splint intact. All questions answered, we will schedule surgery in the near 

future and pre-op labs, EKG are ordered. 





-Vinnie Prince DO


Orthopedic Hand/Upper Extremity Surgeon











Past Medical History


Past Medical History: COPD, Osteoarthritis (OA)


Additional Past Medical History / Comment(s): glaucoma, FRACTURE OF LEFT WRIST


History of Any Multi-Drug Resistant Organisms: None Reported


Past Surgical History: No Surgical Hx Reported, Uterine Ablation


Additional Past Surgical History / Comment(s): Cosmetic bilateral breast 

implants,BRONCHOSCOPY


Past Anesthesia/Blood Transfusion Reactions: No Reported Reaction


Smoking Status: Current every day smoker





- Past Family History


  ** Mother


Family Medical History: No Reported History





Medications and Allergies


                                Home Medications











 Medication  Instructions  Recorded  Confirmed  Type


 


Latanoprost Ophth [Xalatan 0.005%] 1 drops BOTH EYES HS 07/26/17 01/11/22 

History


 


Timolol 0.5% Ophth Soln [Timoptic 1 drop BOTH EYES DAILY 07/26/17 01/11/22 

History





0.5% Ophth Soln]    


 


ALPRAZolam [Xanax] 0.5 mg PO TID PRN #40 tab 08/02/17 01/11/22 Rx


 


Ibuprofen [Motrin] 200 mg PO Q8HR PRN #1 tab 08/02/17 01/11/22 Rx


 


Acetaminophen [Tylenol Arthritis] 650 mg PO Q6H PRN 01/11/22 01/11/22 History


 


Azithromycin [Zithromax] 250 mg PO DAILY 01/11/22 01/11/22 History


 


Ipratropium-Albuterol Nebulize 3 ml INHALATION RT-QID PRN 01/11/22 01/11/22 

History





[Duoneb 0.5 mg-3 mg/3 ml Soln]    


 


guaiFENesin-/30MG [Mucinex 1 each PO Q12HR PRN 01/11/22 01/11/22 History





Dm]    


 


predniSONE 30 mg PO DAILY 01/11/22 01/11/22 History








                                    Allergies











Allergy/AdvReac Type Severity Reaction Status Date / Time


 


metronidazole [From Flagyl] Allergy  Rash/Hives Verified 01/11/22 08:59














Physical Examination


Osteopathic Statement: *.  No significant issues noted on an osteopathic 

structural exam other than those noted in the History and Physical/Consult.

## 2022-01-12 ENCOUNTER — HOSPITAL ENCOUNTER (OUTPATIENT)
Dept: HOSPITAL 47 - OR | Age: 57
Discharge: HOME | End: 2022-01-12
Attending: ORTHOPAEDIC SURGERY
Payer: MEDICAID

## 2022-01-12 VITALS — RESPIRATION RATE: 16 BRPM

## 2022-01-12 VITALS — DIASTOLIC BLOOD PRESSURE: 83 MMHG | SYSTOLIC BLOOD PRESSURE: 128 MMHG

## 2022-01-12 VITALS — BODY MASS INDEX: 24.1 KG/M2

## 2022-01-12 VITALS — TEMPERATURE: 97.1 F

## 2022-01-12 VITALS — HEART RATE: 95 BPM

## 2022-01-12 DIAGNOSIS — S52.572A: Primary | ICD-10-CM

## 2022-01-12 LAB — GLUCOSE BLD-MCNC: 97 MG/DL (ref 75–99)

## 2022-01-12 PROCEDURE — 76942 ECHO GUIDE FOR BIOPSY: CPT

## 2022-01-12 PROCEDURE — 25609 OPTX DST RD XART FX/EP SEP3+: CPT

## 2022-01-12 PROCEDURE — 64417 NJX AA&/STRD AX NERVE IMG: CPT

## 2022-01-12 RX ADMIN — POTASSIUM CHLORIDE SCH MLS: 14.9 INJECTION, SOLUTION INTRAVENOUS at 10:47

## 2022-01-12 RX ADMIN — POTASSIUM CHLORIDE SCH MLS: 14.9 INJECTION, SOLUTION INTRAVENOUS at 10:24

## 2022-01-12 RX ADMIN — ONDANSETRON ONE MG: 2 INJECTION INTRAMUSCULAR; INTRAVENOUS at 10:24

## 2022-01-12 RX ADMIN — ONDANSETRON ONE MG: 2 INJECTION INTRAMUSCULAR; INTRAVENOUS at 10:47

## 2022-01-12 NOTE — P.OP
Date of Procedure: 01/12/22


Preoperative Diagnosis: 


Left intra-articular distal radius fracture, displaced. 


Postoperative Diagnosis: 


Left intra-articular distal radius fracture, displaced.


Procedure(s) Performed: 


1.) Open reduction internal fixation of left distal radius fracture, 3 parts. 


Implants: 


Biomet DVR Distal Radius Cross Lock Volar Locking Plate, Narrow, Standard. 


Anesthesia: BRODY regional


Surgeon: Vinnie Prince


Assistant #1: Cesar Nugent


Estimated Blood Loss (ml): 10


Pathology: none sent


Condition: stable


Disposition: PACU


Description of Procedure: 


This is a 56 year old female who sustained a displaced intra-articular distal 

radius fracture and presents today for open reduction internal fixation of their

left distal radius fracture .  Risks and benefits of surgery were discussed with

the patient including bleeding, damage to surrounding tissue, infection, need 

for further surgery as well as risks of anesthesia including pulmonary embolism 

and even death and the patient wished to proceed with surgical intervention. The

patients was seen in the pre-operative area by myself. Consent and H&P were 

completed and updated. The correct extremity was marked in the pre-operative 

area by myself and all other questions were answered. 





Operative Narrative:


   The patient was brought to the operating room by the department of 

anesthesia. They remained on the portable stretcher and a rolling hand table was

brought to the side of the operative extremity. Pre-operative time out was 

performed indicating the correct patient, procedure and laterality. All in the 

room agreed. Pre-operative antibiotics were given prior to skin incision. The 

patient was then drifted off to sleep by the department of anesthesia. A 

nonsterile tourniquet was then applied to the operative extremity and the left 

upper extremity was then prepped and draped in normal sterile fashion.  The 

operative extremity was the exsanguinated with an esmarch bandage and the 

tourniquet was inflated to 250mmHg.  


 


A longitudinal incision centered over the FCR tendon was made with a 15-blade 

scalpel.  Blunt dissection was taken down to the FCR tendon sheath using Bovie 

cautery for meticulous hemostasis.  The FCR sheath was opened with tenotomy 

scissors.  The floor of the FCR sheath was then incised with a 15-blade scalpel 

and the FPL tendon and muscle belly was swept bluntly in an ulnar direction to 

reveal the pronator quadratus.  Pronator quadratus was sharply incised with a 

15-blade scalpel along the radial border of the distal radius, coming across 

transversely parallel to the joint at the level of the watershed line, radial 

artery was identified and protected.  Periosteal elevator was then used to 

elevate the pronator quadratus off the distal radius from a radial to ulnar 

fashion.  Fracture fragments were visualized which included an intra-articular 

fragment at the distal radial ulnar joint and a dorsal ulnar fragment best 

visualized on pre-op imaging. A [place] elevator was used to lever the distal 

piece back into place and free up the fractured fragments. A Narrow width 

standard length Barrie/biomet crosslock DVR plate was chosen to fit the 

patients anatomy best. This was placed on the distal radius under direct 

visualization and the K- wire was placed in the shaft k-wire hole. The fracture 

was then reduced to the plate distally and a k-wire was placed in the ulnar most

k-wire hole in the proximal row. Fluoroscopy was then utilized to confirm 

correct placement of plate in the radial/ulnar plane and distal k-wire placement

was confirmed to be proximal to the subchondral bone on 20 degree elevated 

lateral view confirming extra-articular screw placement. Restoration of radial 

height, inclination and volar tilt was achieved. The oblong hole was drilled and

filled with a cortical screw. The proximal row and radial styloid screw hole was

then drilled and filled from ulnar to radial with locking screws. Distal row was

then drilled and filled with locking smooth pegs. Attention was then brought to 

the proximal shaft screws. Proximal crosslocking shaft screws were drilled with 

a nonlocking screws. The wrist joint was the ranged and full smooth 

flexion/extension with no crepitus appreciated. Final imaging was taken 

confirming extra-articular placement of distal screws at DRUJ and radiocarpal 

joint. The wound was then irrigated. Subcutaneous closure was performed with 3-0

vicryl followed by skin closure with 4-0 nylon suture. Sterile dressing 

consisting of adaptic,, 4x4s, and a volar plaster splint was applied.  

Tourniquet was let down and the hand had immediate perfusion. The patient was 

then woken by the department of anesthesia and transferred to PACU in stable 

condition. The patient was then woken by the department of anesthesia and 

transferred to PACU in stable condition.








Vinnie Prince D.O.


Orthopedic Hand/Upper Extremity Surgeon

## 2022-01-12 NOTE — P.ANPRN
Procedure Note - Anesthesia





- Nerve Block Performed


  ** Left Axillary Single


Time Out Performed: Yes


Date of Procedure: 01/12/22


Procedure Start Time: 10:55


Procedure Stop Time: 11:07


Location of Patient: PreOp


Indication: Acute Post-Operative Pain, Requested by Surgeon


Sedation Type: Sedate with meaningful contact maintained


Preparation: Sterile Prep, Sterile Dressing


Position: Supine


Catheter: None


Needle Types: Pajunk


Needle Gauge: 20, 21


Ultrasound used to visualize needle placement: Yes


Ultrasound used to observe medication spread: Yes


Injectate: 0.5% Ropivacaine (see comment for volume) (30 ml + decadron 4 mg)


Blood Aspirated: No


Pain Paresthesia on Injection Noted: No


Resistance on Injection: Normal


Image Stored and Saved: Yes


Events: Uneventful and Well Tolerated

## 2022-09-06 ENCOUNTER — HOSPITAL ENCOUNTER (OUTPATIENT)
Dept: HOSPITAL 47 - LABWHC1 | Age: 57
Discharge: HOME | End: 2022-09-06
Attending: INTERNAL MEDICINE
Payer: MEDICAID

## 2022-09-06 DIAGNOSIS — Z00.00: Primary | ICD-10-CM

## 2022-09-06 LAB
ALBUMIN SERPL-MCNC: 4.8 G/DL (ref 3.8–4.9)
ALBUMIN/GLOB SERPL: 2.35 G/DL (ref 1.6–3.17)
ALP SERPL-CCNC: 63 U/L (ref 41–126)
ALT SERPL-CCNC: 24 U/L (ref 8–44)
ANION GAP SERPL CALC-SCNC: 10 MMOL/L (ref 10–18)
AST SERPL-CCNC: 17 U/L (ref 13–35)
BASOPHILS # BLD AUTO: 0.06 X 10*3/UL (ref 0–0.1)
BASOPHILS NFR BLD AUTO: 0.7 %
BUN SERPL-SCNC: 11.8 MG/DL (ref 9–27)
BUN/CREAT SERPL: 15.05 RATIO (ref 12–20)
CALCIUM SPEC-MCNC: 9.8 MG/DL (ref 8.7–10.3)
CHLORIDE SERPL-SCNC: 100 MMOL/L (ref 96–109)
CHOLEST SERPL-MCNC: 301 MG/DL (ref 0–200)
CO2 SERPL-SCNC: 26.4 MMOL/L (ref 20–27.5)
EOSINOPHIL # BLD AUTO: 0.07 X 10*3/UL (ref 0.04–0.35)
EOSINOPHIL NFR BLD AUTO: 0.8 %
ERYTHROCYTE [DISTWIDTH] IN BLOOD BY AUTOMATED COUNT: 4.35 X 10*6/UL (ref 4.1–5.2)
ERYTHROCYTE [DISTWIDTH] IN BLOOD: 13.1 % (ref 11.5–14.5)
GLOBULIN SER CALC-MCNC: 2.1 G/DL (ref 1.6–3.3)
GLUCOSE SERPL-MCNC: 101 MG/DL (ref 70–110)
HCT VFR BLD AUTO: 42.6 % (ref 37.2–46.3)
HDLC SERPL-MCNC: 71.1 MG/DL (ref 40–60)
HGB BLD-MCNC: 14.1 G/DL (ref 12–15)
IMM GRANULOCYTES BLD QL AUTO: 0.5 %
LDLC SERPL CALC-MCNC: 183.1 MG/DL (ref 0–131)
LYMPHOCYTES # SPEC AUTO: 1.47 X 10*3/UL (ref 0.9–5)
LYMPHOCYTES NFR SPEC AUTO: 17.7 %
MCH RBC QN AUTO: 32.4 PG (ref 27–32)
MCHC RBC AUTO-ENTMCNC: 33.1 G/DL (ref 32–37)
MCV RBC AUTO: 97.9 FL (ref 80–97)
MONOCYTES # BLD AUTO: 0.59 X 10*3/UL (ref 0.2–1)
MONOCYTES NFR BLD AUTO: 7.1 %
NEUTROPHILS # BLD AUTO: 6.08 X 10*3/UL (ref 1.8–7.7)
NEUTROPHILS NFR BLD AUTO: 73.2 %
NRBC BLD AUTO-RTO: 0 /100 WBCS (ref 0–0)
PH UR: 5.5 [PH] (ref 5–8)
PLATELET # BLD AUTO: 249 X 10*3/UL (ref 140–440)
POTASSIUM SERPL-SCNC: 4.2 MMOL/L (ref 3.5–5.5)
PROT SERPL-MCNC: 6.9 G/DL (ref 6.2–8.2)
SODIUM SERPL-SCNC: 136 MMOL/L (ref 135–145)
SP GR UR: 1.01 (ref 1–1.03)
T4 FREE SERPL-MCNC: 1.29 NG/DL (ref 0.8–1.8)
TRIGL SERPL-MCNC: 234 MG/DL (ref 0–149)
UROBILINOGEN UR QL: 0.2
VLDLC SERPL CALC-MCNC: 46.8 MG/DL (ref 5–40)
WBC # BLD AUTO: 8.31 X 10*3/UL (ref 4.5–10)

## 2022-09-06 PROCEDURE — 82306 VITAMIN D 25 HYDROXY: CPT

## 2022-09-06 PROCEDURE — 80061 LIPID PANEL: CPT

## 2022-09-06 PROCEDURE — 84439 ASSAY OF FREE THYROXINE: CPT

## 2022-09-06 PROCEDURE — 85025 COMPLETE CBC W/AUTO DIFF WBC: CPT

## 2022-09-06 PROCEDURE — 36415 COLL VENOUS BLD VENIPUNCTURE: CPT

## 2022-09-06 PROCEDURE — 81003 URINALYSIS AUTO W/O SCOPE: CPT

## 2022-09-06 PROCEDURE — 84443 ASSAY THYROID STIM HORMONE: CPT

## 2022-09-06 PROCEDURE — 80053 COMPREHEN METABOLIC PANEL: CPT

## 2022-09-06 PROCEDURE — 83036 HEMOGLOBIN GLYCOSYLATED A1C: CPT

## 2022-09-22 ENCOUNTER — HOSPITAL ENCOUNTER (OUTPATIENT)
Dept: HOSPITAL 47 - RADCTMAIN | Age: 57
Discharge: HOME | End: 2022-09-22
Attending: INTERNAL MEDICINE
Payer: MEDICAID

## 2022-09-22 DIAGNOSIS — Z12.31: Primary | ICD-10-CM

## 2022-09-22 DIAGNOSIS — Z87.891: ICD-10-CM

## 2022-09-22 DIAGNOSIS — Z12.2: ICD-10-CM

## 2022-09-22 PROCEDURE — 71271 CT THORAX LUNG CANCER SCR C-: CPT

## 2022-09-22 PROCEDURE — 77067 SCR MAMMO BI INCL CAD: CPT

## 2022-09-22 PROCEDURE — 77063 BREAST TOMOSYNTHESIS BI: CPT

## 2022-09-22 NOTE — CTL
EXAMINATION TYPE:  CT Low Dose Lung

 

DATE OF EXAM ORDERED: 9/22/2022

 

HISTORY: Lung cancer screening

 

CT DLP: 94.6 mGycm

CT CTDI: 2.6 mGy

Automated exposure control for dose reduction was used.

 

SCREENING VISIT: Subsequent visit

 

COMPARISON: 9/16/2020 and 7/28/2017

 

TECHNIQUE: Low dose computed tomography scan was performed through the chest at 1 mm thick sections a
nd reconstructed images in multiple planes at 1 mm and 5 mm thick sections.

 

CT DIAGNOSTIC QUALITY: Satisfactory

 

FINDINGS:

LUNG NODULES:

Scattered nodular densities are seen throughout the lungs most pronounced along the periphery in the 
posterior aspect of the lower lobes. Overall these have a similar appearance to prior examination fro
m 2020. Majority of these noncalcified pulmonary nodules are sub-6 mm and distal majority are calcifi
ed granulomas. Given findings on 7/28/2017 some of these are likely postinfectious and/or granulomato
us disease..

 

LUNGS:

COPD: Severity: Mild

Fibrosis: Severity: None

Lymph nodes: None

Other findings: Scattered calcified granulomas

 

RIGHT PLEURAL SPACE:

Effusion: None

Calcification: None

Thickening: None

Pneumothorax: None

 

LEFT PLEURAL SPACE:

Effusion: None

Calcification: None

Thickening: None

Pneumothorax: None

 

HEART:  

Heart Size: Normal

Coronary Calcification: minimal

Pericardial Effusion: None

 

OTHER FINDINGS:

Upper abdomen: None

Bony thorax: Mild multilevel disc degeneration changes.Posterior remote rib fractures noted on the le
ft.

Supraclavicular region: None

Other: Bilateral breast implants which appear intact. There is some capsular calcifications bilateral
ly. 

 

IMPRESSION:

Scattered innumerable sub-6 mm pulmonary nodules. No suspicious pulmonary nodules at this time.

 

CT LUNG RAD AND CT CHEST RECOMMENDATION: Lung-Rad 2 Benign Appearance or Behavior: Continue annual sc
reening with LDCT in 12 months.

 

S Modifier (other clinically significant findings): None

## 2022-09-23 NOTE — MM
Reason for Exam: Screening  (asymptomatic). 

Last mammogram was performed 3 year(s) and 6 month(s) ago. 





Patient History: 

Menarche at age 12. First Full-Term Pregnancy at age 25. Postmenopausal. Hormonal Contraceptives,

from age 16 until age 32. 01/1997, Bilateral Implants. 





Risk Values: 

Myra 5 year model risk: 1.4%.

NCI Lifetime model risk: 8.7%.





Prior Study Comparison: 

1/5/2018 Bilateral Screening Mammogram, [facility]. 3/15/2019 Bilateral Screening Mammogram, [facility]. 3/26/2019

Left Diagnostic Mammogram, [facility]. 





Tissue Density: 

The breast tissue is extremely dense which could obscure a lesion on mammography.

Analyzed By CAD. 





Overall Assessment: Benign, BI-RAD 2





Management: 

Screening Mammogram of both breasts in 1 year.

Electronically signed and approved by: Milton Negrete D.O.